# Patient Record
Sex: FEMALE | Race: WHITE | NOT HISPANIC OR LATINO | Employment: FULL TIME | URBAN - METROPOLITAN AREA
[De-identification: names, ages, dates, MRNs, and addresses within clinical notes are randomized per-mention and may not be internally consistent; named-entity substitution may affect disease eponyms.]

---
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
## 2021-01-15 ENCOUNTER — HOSPITAL ENCOUNTER (EMERGENCY)
Facility: HOSPITAL | Age: 21
Discharge: HOME | End: 2021-01-15
Attending: EMERGENCY MEDICINE | Admitting: EMERGENCY MEDICINE
Payer: COMMERCIAL

## 2021-01-15 ENCOUNTER — APPOINTMENT (EMERGENCY)
Dept: CT IMAGING | Facility: HOSPITAL | Age: 21
End: 2021-01-15
Payer: COMMERCIAL

## 2021-01-15 ENCOUNTER — OFFICE VISIT (OUTPATIENT)
Dept: URGENT CARE | Facility: MEDICAL CENTER | Age: 21
End: 2021-01-15
Payer: COMMERCIAL

## 2021-01-15 VITALS
HEART RATE: 101 BPM | TEMPERATURE: 98.3 F | OXYGEN SATURATION: 98 % | DIASTOLIC BLOOD PRESSURE: 90 MMHG | RESPIRATION RATE: 18 BRPM | SYSTOLIC BLOOD PRESSURE: 175 MMHG

## 2021-01-15 VITALS
WEIGHT: 220 LBS | RESPIRATION RATE: 18 BRPM | SYSTOLIC BLOOD PRESSURE: 105 MMHG | OXYGEN SATURATION: 99 % | HEIGHT: 67 IN | TEMPERATURE: 98 F | BODY MASS INDEX: 34.53 KG/M2 | DIASTOLIC BLOOD PRESSURE: 72 MMHG | HEART RATE: 114 BPM

## 2021-01-15 DIAGNOSIS — R10.9 ABDOMINAL PAIN: Primary | ICD-10-CM

## 2021-01-15 DIAGNOSIS — R10.31 RIGHT LOWER QUADRANT ABDOMINAL PAIN: Primary | ICD-10-CM

## 2021-01-15 LAB
ALBUMIN SERPL BCP-MCNC: 4.2 G/DL (ref 3.5–5)
ALP SERPL-CCNC: 62 U/L (ref 46–384)
ALT SERPL W P-5'-P-CCNC: 35 U/L (ref 12–78)
ANION GAP SERPL CALCULATED.3IONS-SCNC: 10 MMOL/L (ref 4–13)
AST SERPL W P-5'-P-CCNC: 22 U/L (ref 5–45)
BACTERIA UR QL AUTO: ABNORMAL /HPF
BASOPHILS # BLD AUTO: 0.06 THOUSANDS/ΜL (ref 0–0.1)
BASOPHILS NFR BLD AUTO: 1 % (ref 0–1)
BILIRUB SERPL-MCNC: 0.6 MG/DL (ref 0.2–1)
BILIRUB UR QL STRIP: NEGATIVE
BUN SERPL-MCNC: 15 MG/DL (ref 5–25)
CALCIUM SERPL-MCNC: 9.2 MG/DL (ref 8.3–10.1)
CHLORIDE SERPL-SCNC: 103 MMOL/L (ref 100–108)
CLARITY UR: CLEAR
CO2 SERPL-SCNC: 26 MMOL/L (ref 21–32)
COLOR UR: ABNORMAL
CREAT SERPL-MCNC: 1 MG/DL (ref 0.6–1.3)
EOSINOPHIL # BLD AUTO: 0.02 THOUSAND/ΜL (ref 0–0.61)
EOSINOPHIL NFR BLD AUTO: 0 % (ref 0–6)
ERYTHROCYTE [DISTWIDTH] IN BLOOD BY AUTOMATED COUNT: 12.5 % (ref 11.6–15.1)
EXT PREG TEST URINE: NEGATIVE
EXT. CONTROL ED NAV: NORMAL
GFR SERPL CREATININE-BSD FRML MDRD: 82 ML/MIN/1.73SQ M
GLUCOSE SERPL-MCNC: 87 MG/DL (ref 65–140)
GLUCOSE UR STRIP-MCNC: NEGATIVE MG/DL
HCT VFR BLD AUTO: 38.8 % (ref 34.8–46.1)
HGB BLD-MCNC: 12.8 G/DL (ref 11.5–15.4)
HGB UR QL STRIP.AUTO: ABNORMAL
IMM GRANULOCYTES # BLD AUTO: 0.03 THOUSAND/UL (ref 0–0.2)
IMM GRANULOCYTES NFR BLD AUTO: 0 % (ref 0–2)
KETONES UR STRIP-MCNC: NEGATIVE MG/DL
LEUKOCYTE ESTERASE UR QL STRIP: NEGATIVE
LIPASE SERPL-CCNC: 101 U/L (ref 73–393)
LYMPHOCYTES # BLD AUTO: 2.14 THOUSANDS/ΜL (ref 0.6–4.47)
LYMPHOCYTES NFR BLD AUTO: 23 % (ref 14–44)
MCH RBC QN AUTO: 30.7 PG (ref 26.8–34.3)
MCHC RBC AUTO-ENTMCNC: 33 G/DL (ref 31.4–37.4)
MCV RBC AUTO: 93 FL (ref 82–98)
MONOCYTES # BLD AUTO: 0.77 THOUSAND/ΜL (ref 0.17–1.22)
MONOCYTES NFR BLD AUTO: 8 % (ref 4–12)
MUCOUS THREADS UR QL AUTO: ABNORMAL
NEUTROPHILS # BLD AUTO: 6.47 THOUSANDS/ΜL (ref 1.85–7.62)
NEUTS SEG NFR BLD AUTO: 68 % (ref 43–75)
NITRITE UR QL STRIP: NEGATIVE
NON-SQ EPI CELLS URNS QL MICRO: ABNORMAL /HPF
NRBC BLD AUTO-RTO: 0 /100 WBCS
PH UR STRIP.AUTO: 6 [PH]
PLATELET # BLD AUTO: 397 THOUSANDS/UL (ref 149–390)
PMV BLD AUTO: 9.7 FL (ref 8.9–12.7)
POTASSIUM SERPL-SCNC: 3.6 MMOL/L (ref 3.5–5.3)
PROT SERPL-MCNC: 8.3 G/DL (ref 6.4–8.2)
PROT UR STRIP-MCNC: NEGATIVE MG/DL
RBC # BLD AUTO: 4.17 MILLION/UL (ref 3.81–5.12)
RBC #/AREA URNS AUTO: ABNORMAL /HPF
SL AMB  POCT GLUCOSE, UA: NEGATIVE
SL AMB LEUKOCYTE ESTERASE,UA: NEGATIVE
SL AMB POCT BILIRUBIN,UA: NEGATIVE
SL AMB POCT BLOOD,UA: ABNORMAL
SL AMB POCT CLARITY,UA: ABNORMAL
SL AMB POCT COLOR,UA: ABNORMAL
SL AMB POCT KETONES,UA: NEGATIVE
SL AMB POCT NITRITE,UA: NEGATIVE
SL AMB POCT PH,UA: 5
SL AMB POCT SPECIFIC GRAVITY,UA: 1.03
SL AMB POCT URINE PROTEIN: ABNORMAL
SL AMB POCT UROBILINOGEN: 0.2
SODIUM SERPL-SCNC: 139 MMOL/L (ref 136–145)
SP GR UR STRIP.AUTO: >=1.03 (ref 1–1.03)
UROBILINOGEN UR QL STRIP.AUTO: 0.2 E.U./DL
WBC # BLD AUTO: 9.49 THOUSAND/UL (ref 4.31–10.16)
WBC #/AREA URNS AUTO: ABNORMAL /HPF

## 2021-01-15 PROCEDURE — G0381 LEV 2 HOSP TYPE B ED VISIT: HCPCS | Performed by: PHYSICIAN ASSISTANT

## 2021-01-15 PROCEDURE — 85025 COMPLETE CBC W/AUTO DIFF WBC: CPT | Performed by: EMERGENCY MEDICINE

## 2021-01-15 PROCEDURE — 99282 EMERGENCY DEPT VISIT SF MDM: CPT | Performed by: PHYSICIAN ASSISTANT

## 2021-01-15 PROCEDURE — 83690 ASSAY OF LIPASE: CPT | Performed by: EMERGENCY MEDICINE

## 2021-01-15 PROCEDURE — 81001 URINALYSIS AUTO W/SCOPE: CPT | Performed by: EMERGENCY MEDICINE

## 2021-01-15 PROCEDURE — 74177 CT ABD & PELVIS W/CONTRAST: CPT

## 2021-01-15 PROCEDURE — 99284 EMERGENCY DEPT VISIT MOD MDM: CPT | Performed by: EMERGENCY MEDICINE

## 2021-01-15 PROCEDURE — 99284 EMERGENCY DEPT VISIT MOD MDM: CPT

## 2021-01-15 PROCEDURE — 96361 HYDRATE IV INFUSION ADD-ON: CPT

## 2021-01-15 PROCEDURE — 96374 THER/PROPH/DIAG INJ IV PUSH: CPT

## 2021-01-15 PROCEDURE — 81025 URINE PREGNANCY TEST: CPT | Performed by: EMERGENCY MEDICINE

## 2021-01-15 PROCEDURE — 80053 COMPREHEN METABOLIC PANEL: CPT | Performed by: EMERGENCY MEDICINE

## 2021-01-15 PROCEDURE — 81002 URINALYSIS NONAUTO W/O SCOPE: CPT | Performed by: PHYSICIAN ASSISTANT

## 2021-01-15 PROCEDURE — 36415 COLL VENOUS BLD VENIPUNCTURE: CPT | Performed by: EMERGENCY MEDICINE

## 2021-01-15 RX ORDER — KETOROLAC TROMETHAMINE 30 MG/ML
15 INJECTION, SOLUTION INTRAMUSCULAR; INTRAVENOUS ONCE
Status: COMPLETED | OUTPATIENT
Start: 2021-01-15 | End: 2021-01-15

## 2021-01-15 RX ADMIN — KETOROLAC TROMETHAMINE 15 MG: 30 INJECTION, SOLUTION INTRAMUSCULAR at 21:32

## 2021-01-15 RX ADMIN — SODIUM CHLORIDE 1000 ML: 0.9 INJECTION, SOLUTION INTRAVENOUS at 21:25

## 2021-01-15 RX ADMIN — IOHEXOL 100 ML: 350 INJECTION, SOLUTION INTRAVENOUS at 22:26

## 2021-01-16 NOTE — ED PROVIDER NOTES
History  Chief Complaint   Patient presents with    Abdominal Pain     Pt presents to ED from urgent care with right upper quadrant pain, denies n/v, states blood in stool Monday,       23-year-old female presents for evaluation of right upper quadrant abdominal pain it has been ongoing for the last 4 days  Denies having similar symptoms previously  Pain is worse with palpation and after eating  Pain has been constant, moderate  Was evaluated at urgent care and advised to come to the emergency department for further evaluation  Patient is currently expecting her menstrual cycle but has not started yet  LMP approximately 4 weeks ago  Denies vaginal bleeding/discharge at this time  No urinary symptoms  Prior to Admission Medications   Prescriptions Last Dose Informant Patient Reported? Taking? JUNEL FE 1/20 1-20 MG-MCG per tablet   No No   Sig: TAKE 1 TABLET BY MOUTH EVERY DAY   QUEtiapine (SEROquel) 50 mg tablet  Self Yes No   Sig: Take 100 mg by mouth daily at bedtime    meclizine (ANTIVERT) 25 mg tablet   No No   Sig: Take 1 tablet (25 mg total) by mouth 3 (three) times a day as needed for dizziness   Patient not taking: Reported on 1/15/2021      Facility-Administered Medications Last Administration Doses Remaining   cyanocobalamin injection 1,000 mcg 8/25/2020  3:17 PM           Past Medical History:   Diagnosis Date    Bipolar 1 disorder ([facility] [address] )     Psychiatric disorder     bipolar        History reviewed  No pertinent surgical history  Family History   Problem Relation Age of Onset    Anxiety disorder Family     Other Family         cardiac disorder    Diabetes Family     Hypertension Family     Breast cancer Family     Colon cancer Neg Hx     Colon polyps Neg Hx      I have reviewed and agree with the history as documented      E-Cigarette/Vaping    E-Cigarette Use Current Every Day User      E-Cigarette/Vaping Substances    Nicotine Yes     THC No     CBD No     Flavoring Yes Social History     Tobacco Use    Smoking status: Current Every Day Smoker     Types: E-Cigarettes    Smokeless tobacco: Never Used   Substance Use Topics    Alcohol use: Never     Frequency: Never    Drug use: Yes     Types: Marijuana     Comment: Medical MJ       Review of Systems   Constitutional: Negative for chills, diaphoresis and fever  HENT: Negative for congestion and rhinorrhea  Eyes: Negative for pain and visual disturbance  Respiratory: Negative for cough, shortness of breath and wheezing  Cardiovascular: Negative for chest pain and leg swelling  Gastrointestinal: Positive for abdominal pain  Negative for diarrhea, nausea and vomiting  Genitourinary: Negative for difficulty urinating, dysuria, frequency and urgency  Musculoskeletal: Negative for back pain and neck pain  Skin: Negative for color change and rash  Neurological: Negative for syncope, numbness and headaches  All other systems reviewed and are negative  Physical Exam  Physical Exam  Vitals signs and nursing note reviewed  Constitutional:       Appearance: She is well-developed  HENT:      Head: Normocephalic and atraumatic  Eyes:      Conjunctiva/sclera: Conjunctivae normal    Neck:      Musculoskeletal: Normal range of motion and neck supple  Cardiovascular:      Rate and Rhythm: Normal rate and regular rhythm  Pulmonary:      Effort: Pulmonary effort is normal  No respiratory distress  Abdominal:      Palpations: Abdomen is soft  Tenderness: There is abdominal tenderness in the right upper quadrant, right lower quadrant and epigastric area  Comments: Tender to palpation in right upper quadrant  Positive Merchant sign  Musculoskeletal: Normal range of motion  General: No tenderness  Skin:     General: Skin is warm  Findings: No erythema  Neurological:      Mental Status: She is alert and oriented to person, place, and time     Psychiatric:         Behavior: Behavior normal          Vital Signs  ED Triage Vitals   Temperature Pulse Respirations Blood Pressure SpO2   01/15/21 2252 01/15/21 2101 01/15/21 2101 01/15/21 2101 01/15/21 2101   98 3 °F (36 8 °C) (!) 126 18 141/75 99 %      Temp src Heart Rate Source Patient Position - Orthostatic VS BP Location FiO2 (%)   -- 01/15/21 2300 -- -- --    Monitor         Pain Score       01/15/21 2132       6           Vitals:    01/15/21 2101 01/15/21 2230 01/15/21 2300   BP: 141/75 124/82 (!) 175/90   Pulse: (!) 126  101         Visual Acuity      ED Medications  Medications   ketorolac (TORADOL) injection 15 mg (15 mg Intravenous Given 1/15/21 2132)   sodium chloride 0 9 % bolus 1,000 mL (0 mL Intravenous Stopped 1/15/21 2225)   iohexol (OMNIPAQUE) 350 MG/ML injection (SINGLE-DOSE) 100 mL (100 mL Intravenous Given 1/15/21 2226)       Diagnostic Studies  Results Reviewed     Procedure Component Value Units Date/Time    Urine Microscopic [[id number]]  (Abnormal) Collected: 01/15/21 2131    Lab Status: Final result Specimen: Urine, Clean Catch Updated: 01/15/21 2206     RBC, UA 2-4 /hpf      WBC, UA 0-1 /hpf      Epithelial Cells Moderate /hpf      Bacteria, UA Moderate /hpf      MUCUS THREADS Innumerable    CMP [[id number]]  (Abnormal) Collected: 01/15/21 2124    Lab Status: Final result Specimen: Blood from Arm, Right Updated: 01/15/21 2159     Sodium 139 mmol/L      Potassium 3 6 mmol/L      Chloride 103 mmol/L      CO2 26 mmol/L      ANION GAP 10 mmol/L      BUN 15 mg/dL      Creatinine 1 00 mg/dL      Glucose 87 mg/dL      Calcium 9 2 mg/dL      AST 22 U/L      ALT 35 U/L      Alkaline Phosphatase 62 U/L      Total Protein 8 3 g/dL      Albumin 4 2 g/dL      Total Bilirubin 0 60 mg/dL      eGFR 82 ml/min/1 73sq m     Narrative:      James guidelines for Chronic Kidney Disease (CKD):     Stage 1 with normal or high GFR (GFR > 90 mL/min/1 73 square meters)    Stage 2 Mild CKD (GFR = 60-89 mL/min/1 73 square meters)    Stage 3A Moderate CKD (GFR = 45-59 mL/min/1 73 square meters)    Stage 3B Moderate CKD (GFR = 30-44 mL/min/1 73 square meters)    Stage 4 Severe CKD (GFR = 15-29 mL/min/1 73 square meters)    Stage 5 End Stage CKD (GFR <15 mL/min/1 73 square meters)  Note: GFR calculation is accurate only with a steady state creatinine    Lipase [[id number]]  (Normal) Collected: 01/15/21 2124    Lab Status: Final result Specimen: Blood from Arm, Right Updated: 01/15/21 2159     Lipase 101 u/L     POCT pregnancy, urine [[id number]]  (Normal) Resulted: 01/15/21 2130    Lab Status: Final result Specimen: Urine Updated: 01/15/21 2159     EXT PREG TEST UR (Ref: Negative) Negative     Control Valid    UA w Reflex to Microscopic w Reflex to Culture [[id number]]  (Abnormal) Collected: 01/15/21 2131    Lab Status: Final result Specimen: Urine, Clean Catch Updated: 01/15/21 2149     Color, UA Dk Yellow     Clarity, UA Clear     Specific Gravity, UA >=1 030     pH, UA 6 0     Leukocytes, UA Negative     Nitrite, UA Negative     Protein, UA Negative mg/dl      Glucose, UA Negative mg/dl      Ketones, UA Negative mg/dl      Urobilinogen, UA 0 2 E U /dl      Bilirubin, UA Negative     Blood, UA Small    CBC and differential [[id number]]  (Abnormal) Collected: 01/15/21 2124    Lab Status: Final result Specimen: Blood from Arm, Right Updated: 01/15/21 2131     WBC 9 49 Thousand/uL      RBC 4 17 Million/uL      Hemoglobin 12 8 g/dL      Hematocrit 38 8 %      MCV 93 fL      MCH 30 7 pg      MCHC 33 0 g/dL      RDW 12 5 %      MPV 9 7 fL      Platelets 686 Thousands/uL      nRBC 0 /100 WBCs      Neutrophils Relative 68 %      Immat GRANS % 0 %      Lymphocytes Relative 23 %      Monocytes Relative 8 %      Eosinophils Relative 0 %      Basophils Relative 1 %      Neutrophils Absolute 6 47 Thousands/µL      Immature Grans Absolute 0 03 Thousand/uL      Lymphocytes Absolute 2 14 Thousands/µL      Monocytes Absolute 0 77 Thousand/µL Eosinophils Absolute 0 02 Thousand/µL      Basophils Absolute 0 06 Thousands/µL                  CT Abdomen pelvis with contrast   Final Result by Tara Carlson DO (01/15 2250)      No acute findings            Workstation performed: [id number]                    Procedures  Procedures         ED Course                                           MDM  Number of Diagnoses or Management Options  Diagnosis management comments: 81-year-old female presenting with right-sided abdominal pain most prominent in right upper quadrant  Will obtain labs, urinalysis and pregnancy test   CT abdomen pelvis  Pain control as needed  Disposition  Final diagnoses:   Abdominal pain     Time reflects when diagnosis was documented in both MDM as applicable and the Disposition within this note     Time User Action Codes Description Comment    1/15/2021 10:59 PM Guillermo Beebe Add [R10 9] Abdominal pain       ED Disposition     ED Disposition Condition Date/Time Comment    Discharge Stable Fri Carlos 15, 2021 10:59 PM Elta Session discharge to home/self care              Follow-up Information     Follow up With Specialties Details Why Contact Info Additional Information    Gissel Monteiro MD Family Medicine Schedule an appointment as soon as possible for a visit   [address] [facility] 2  [facility] 98  Emergency Department Emergency Medicine  If symptoms worsen [address] [address]  [address] [facility] Emergency Department, [address] [facility], [address]    [address] [facility] [address] Gastroenterology Schedule an appointment as soon as possible for a visit   [address] [address]  [phone] [address] [facility] [address] , [facility] Space [address] [place] [address], [address] [phone]          Discharge Medication List as of 1/15/2021 11:03 PM      CONTINUE these medications which have NOT CHANGED    Details   JUNEL FE 1/20 1-20 MG-MCG per tablet TAKE 1 TABLET BY MOUTH EVERY DAY, Normal      meclizine (ANTIVERT) 25 mg tablet Take 1 tablet (25 mg total) by mouth 3 (three) times a day as needed for dizziness, Starting u 10/15/2020, Normal      QUEtiapine (SEROquel) 50 mg tablet Take 100 mg by mouth daily at bedtime , Historical Med           No discharge procedures on file      PDMP Review     None          ED Provider  Electronically Signed by           Kalyn Singh DO  01/16/21 6167

## 2021-01-16 NOTE — PROGRESS NOTES
[facility]        NAME: Samantha Bingham is a 24 y o  female  : 2000    MRN: [id number]  DATE: January 15, 2021  TIME: 7:58 PM    Assessment and Plan   Right lower quadrant abdominal pain [R10 31]  1  Right lower quadrant abdominal pain  Urine culture    POCT urine dip    Transfer to other facility         Patient Instructions       To emergency room now  I explained to her did we cannot evaluate his further here  Chief Complaint     Chief Complaint   Patient presents with    Abdominal Pain     Pt c/o urinary frequency, intermittent sharp right-sided abdominal pain for the past 5 days  Pt also reports having diarrhea, blood in her stool for 5 days  Pt is concerned because she recently was treated for chlamydia in November  When asked about her high HR, patient states she is currently high on medical marijuana due to the pain  History of Present Illness       Patient has a history of endometriosis but she said this pain is different  She has also had blood in her stools on over the last 2 days  She is to start her menstrual cycle tomorrow  There is blood in her urine  Abdominal Pain  This is a new problem  The current episode started in the past 7 days  The onset quality is sudden  The problem occurs intermittently  The problem has been gradually worsening  The pain is located in the RLQ  The pain is at a severity of 6/10  The quality of the pain is cramping and sharp  The abdominal pain radiates to the RUQ  Associated symptoms include anorexia, hematuria and melena  Pertinent negatives include no arthralgias, belching, constipation, diarrhea, dysuria, fever, flatus, frequency, headaches, hematochezia, myalgias, nausea, vomiting or weight loss  The pain is aggravated by bowel movement, eating and palpation  The pain is relieved by nothing  She has tried nothing for the symptoms  Review of Systems   Review of Systems   Constitutional: Negative for fever and weight loss  Gastrointestinal: Positive for abdominal pain, anorexia and melena  Negative for constipation, diarrhea, flatus, hematochezia, nausea and vomiting  Genitourinary: Positive for hematuria  Negative for dysuria and frequency  Musculoskeletal: Negative for arthralgias and myalgias  Neurological: Negative for headaches  All other systems reviewed and are negative  Current Medications       Current Outpatient Medications:     JUNEL FE 1/20 1-20 MG-MCG per tablet, TAKE 1 TABLET BY MOUTH EVERY DAY, Disp: 84 tablet, Rfl: 3    meclizine (ANTIVERT) 25 mg tablet, Take 1 tablet (25 mg total) by mouth 3 (three) times a day as needed for dizziness (Patient not taking: Reported on 1/15/2021), Disp: 30 tablet, Rfl: 5    QUEtiapine (SEROquel) 50 mg tablet, Take 100 mg by mouth daily at bedtime , Disp: , Rfl:     Current Facility-Administered Medications:     cyanocobalamin injection 1,000 mcg, 1,000 mcg, Intramuscular, Q30 Days, KATINA Cho, 1,000 mcg at 08/25/20 1517    Current Allergies     Allergies as of 01/15/2021 - Reviewed 01/15/2021   Allergen Reaction Noted    Azithromycin Abdominal Pain 09/21/2017            The following portions of the patient's history were reviewed and updated as appropriate: allergies, current medications, past family history, past medical history, past social history, past surgical history and problem list      Past Medical History:   Diagnosis Date    Bipolar 1 disorder ([facility] [address] )     Psychiatric disorder     bipolar        History reviewed  No pertinent surgical history  Family History   Problem Relation Age of Onset    Anxiety disorder Family     Other Family         cardiac disorder    Diabetes Family     Hypertension Family     Breast cancer Family     Colon cancer Neg Hx     Colon polyps Neg Hx          Medications have been verified          Objective   /72   Pulse (!) 114   Temp 98 °F (36 7 °C) (Oral)   Resp 18   Ht 5' 7" (1 702 m)   Wt 99 8 kg (220 lb)   LMP 12/18/2020 (Approximate)   SpO2 99%   BMI 34 46 kg/m²   Patient's last menstrual period was 12/18/2020 (approximate)  Physical Exam     Physical Exam  Vitals signs and nursing note reviewed  Constitutional:       Appearance: She is well-developed  She is obese  Abdominal:      General: Bowel sounds are decreased  Palpations: Abdomen is soft  Tenderness: There is abdominal tenderness in the right lower quadrant  Positive signs include McBurney's sign  Neurological:      Mental Status: She is alert

## 2021-01-18 ENCOUNTER — VBI (OUTPATIENT)
Dept: FAMILY MEDICINE CLINIC | Facility: CLINIC | Age: 21
End: 2021-01-18

## 2021-01-18 NOTE — TELEPHONE ENCOUNTER
Pritesh Arguello    ED Visit Information     Ed visit date: 1/15/2021  Diagnosis Description:   Abdominal pain     In Network? Yes [address]  Discharge status: Home  Discharged with meds ? No  Number of ED visits to date: 1  ED Severity:NA     Outreach Information    Outreach successful: Yes 1  Date letter mailed:NA  Date Finalized:1/18/2021    Care Coordination    Follow up appointment with pcp: no Declined  Transportation issues ? No    [facility] [employer] type: 7 Day Outreach  Emergent necessity warranted by diagnosis: No  [facility] PCP: Yes  Transportation: Friend/Family Transport  01/18/2021 03:25 PM Phone ([employer]) Jarocho Padilla (Self) [phone] (H)   Call Complete  Personal communication with patient regarding her recent ED visit on 1/15/2021 for Abdominal pain  Patient was discharged without medication and advised to follow up with PCP and gastroenterology  Patient stated that she is feeling better since making dietary changes  Patient declined to schedule a follow up appointment  Patient does not meet OPCM criteria  Patient is aware of PCP after hours answering service and her nearest care now facility

## 2021-01-22 ENCOUNTER — OFFICE VISIT (OUTPATIENT)
Dept: FAMILY MEDICINE CLINIC | Facility: CLINIC | Age: 21
End: 2021-01-22
Payer: COMMERCIAL

## 2021-01-22 VITALS
DIASTOLIC BLOOD PRESSURE: 78 MMHG | BODY MASS INDEX: 35.47 KG/M2 | SYSTOLIC BLOOD PRESSURE: 118 MMHG | HEIGHT: 67 IN | WEIGHT: 226 LBS | HEART RATE: 104 BPM | TEMPERATURE: 98.1 F | RESPIRATION RATE: 16 BRPM | OXYGEN SATURATION: 97 %

## 2021-01-22 DIAGNOSIS — Z11.3 SCREENING FOR STDS (SEXUALLY TRANSMITTED DISEASES): ICD-10-CM

## 2021-01-22 DIAGNOSIS — J01.10 ACUTE FRONTAL SINUSITIS, RECURRENCE NOT SPECIFIED: Primary | ICD-10-CM

## 2021-01-22 DIAGNOSIS — R07.9 CHEST PAIN, UNSPECIFIED TYPE: ICD-10-CM

## 2021-01-22 DIAGNOSIS — R35.0 URINARY FREQUENCY: ICD-10-CM

## 2021-01-22 LAB
SL AMB  POCT GLUCOSE, UA: NORMAL
SL AMB LEUKOCYTE ESTERASE,UA: NORMAL
SL AMB POCT BILIRUBIN,UA: NORMAL
SL AMB POCT BLOOD,UA: NORMAL
SL AMB POCT CLARITY,UA: CLEAR
SL AMB POCT COLOR,UA: NORMAL
SL AMB POCT KETONES,UA: 15
SL AMB POCT NITRITE,UA: NORMAL
SL AMB POCT PH,UA: 5.5
SL AMB POCT SPECIFIC GRAVITY,UA: >=1.03
SL AMB POCT URINE PROTEIN: NORMAL
SL AMB POCT UROBILINOGEN: 0.2

## 2021-01-22 PROCEDURE — 99214 OFFICE O/P EST MOD 30 MIN: CPT | Performed by: NURSE PRACTITIONER

## 2021-01-22 PROCEDURE — 81002 URINALYSIS NONAUTO W/O SCOPE: CPT | Performed by: NURSE PRACTITIONER

## 2021-01-22 PROCEDURE — 93000 ELECTROCARDIOGRAM COMPLETE: CPT | Performed by: NURSE PRACTITIONER

## 2021-01-22 RX ORDER — DOXYCYCLINE HYCLATE 100 MG/1
100 CAPSULE ORAL EVERY 12 HOURS SCHEDULED
Qty: 20 CAPSULE | Refills: 0 | Status: SHIPPED | OUTPATIENT
Start: 2021-01-22 | End: 2021-02-01

## 2021-01-22 NOTE — LETTER
January 22, 2021     Patient: Bruno Rivera   YOB: 2000   Date of Visit: 1/22/2021       To Whom it May Concern:    Bruno Rivera is under my professional care  She was seen in my office on 1/22/2021  She may return to work on 1/24/2021  My suspicion is low that symptoms are related to Covid  If you have any questions or concerns, please don't hesitate to call           Sincerely,          KATINA Steward        CC: No Recipients

## 2021-01-22 NOTE — PROGRESS NOTES
[facility]        NAME: Sae Mccollum is a 24 y o  female  : 2000    MRN: [id number]  DATE: 2021  TIME: 12:54 PM    Assessment and Plan   Acute frontal sinusitis, recurrence not specified [J01 10]  1  Acute frontal sinusitis, recurrence not specified  doxycycline hyclate (VIBRAMYCIN) 100 mg capsule   2  Screening for STDs (sexually transmitted diseases)  Chlamydia/GC amplified DNA by PCR    Chlamydia/GC amplified DNA by PCR   3  Chest pain, unspecified type  POCT ECG   4  Urinary frequency  POCT urine dip         Patient Instructions     Patient Instructions   Take antibiotic as directed to treat sinusitis  OTC cold/sinus medications to treat symptoms  EKG was normal  Chest pain, fast heart rate may have been from feeling anxious/paniky- find ways to control stress and anxiety(deep breathing, exercise, mediation, therapy)  Urine dip in office was negative  Chlamydia screening as ordered  If symptoms do not improve after you are finished antibiotic call office for a f/up visit  Chief Complaint     Chief Complaint   Patient presents with    Follow-up     SOB/IRREGULAR HEART RATE/COGNITIVE ISSUES X 1 WEEK         History of Present Illness       C/o shortness of breath, fast and irregular heart rate, headache, brain fog x 1 week  Also has sinus pain and pressure, post nasal drip x 1 week  No fever  Hx of vertigo and was supposed to see therapy but had to cancel last month due to being sick  Last night had sharp pain in chest and increase shortness of breath  Has a hx of panic attacks but this feels different  Feels "drunk", does not feel normal    She states her boyfriend had covid in December, she was exposed at that time and had symptoms  She said she had a recent negative Covid test   C/o urinary frequency all the time  States she had chlamydia infection in November and was treated with antibiotics   She wants a test to make sure infection is gone       Review of Systems   Review of Systems   Constitutional: Negative for activity change, appetite change, chills, diaphoresis, fatigue and fever  HENT: Positive for postnasal drip, sinus pressure and sinus pain  Negative for congestion, ear pain, facial swelling and sore throat  Eyes: Positive for photophobia and visual disturbance  Respiratory: Positive for chest tightness and shortness of breath  Negative for cough  Cardiovascular: Positive for chest pain and palpitations  Negative for leg swelling  Gastrointestinal: Positive for nausea  Negative for abdominal pain and vomiting  Genitourinary: Positive for frequency  Negative for dysuria, flank pain, pelvic pain and urgency  Skin: Negative for rash  Neurological: Positive for dizziness, light-headedness and headaches  Negative for weakness and numbness  Psychiatric/Behavioral: Positive for decreased concentration (brain fog)  The patient is nervous/anxious            Current Medications       Current Outpatient Medications:     JUNEL FE 1/20 1-20 MG-MCG per tablet, TAKE 1 TABLET BY MOUTH EVERY DAY, Disp: 84 tablet, Rfl: 3    meclizine (ANTIVERT) 25 mg tablet, Take 1 tablet (25 mg total) by mouth 3 (three) times a day as needed for dizziness, Disp: 30 tablet, Rfl: 5    doxycycline hyclate (VIBRAMYCIN) 100 mg capsule, Take 1 capsule (100 mg total) by mouth every 12 (twelve) hours for 10 days, Disp: 20 capsule, Rfl: 0    QUEtiapine (SEROquel) 50 mg tablet, Take 100 mg by mouth daily at bedtime , Disp: , Rfl:     Current Facility-Administered Medications:     cyanocobalamin injection 1,000 mcg, 1,000 mcg, Intramuscular, Q30 Days, KATINA Gentile, 1,000 mcg at 08/25/20 1517    Current Allergies     Allergies as of 01/22/2021 - Reviewed 01/22/2021   Allergen Reaction Noted    Azithromycin Abdominal Pain 09/21/2017            The following portions of the patient's history were reviewed and updated as appropriate: allergies, current medications, past family history, past medical history, past social history, past surgical history and problem list      Past Medical History:   Diagnosis Date    Bipolar 1 disorder ([address] )     Psychiatric disorder     bipolar        History reviewed  No pertinent surgical history  Family History   Problem Relation Age of Onset    Anxiety disorder Family     Other Family         cardiac disorder    Diabetes Family     Hypertension Family     Breast cancer Family     Colon cancer Neg Hx     Colon polyps Neg Hx          Medications have been verified  Objective   /78   Pulse 104   Temp 98 1 °F (36 7 °C)   Resp 16   Ht 5' 7" (1 702 m)   Wt 103 kg (226 lb)   LMP 01/18/2021   SpO2 97%   BMI 35 40 kg/m²        Physical Exam     Physical Exam  Vitals signs and nursing note reviewed  Constitutional:       General: She is not in acute distress  Appearance: Normal appearance  She is well-developed  She is not diaphoretic  HENT:      Head: Normocephalic and atraumatic  Right Ear: Tympanic membrane, ear canal and external ear normal  There is no impacted cerumen  Left Ear: Tympanic membrane, ear canal and external ear normal  There is no impacted cerumen  Nose: Rhinorrhea present  Right Sinus: Maxillary sinus tenderness and frontal sinus tenderness present  Left Sinus: Maxillary sinus tenderness and frontal sinus tenderness present  Mouth/Throat:      Mouth: Mucous membranes are moist       Pharynx: Uvula midline  Posterior oropharyngeal erythema present  No oropharyngeal exudate  Comments: Moderate post nasal drip present  Eyes:      General:         Right eye: No discharge  Left eye: No discharge  Extraocular Movements: Extraocular movements intact  Conjunctiva/sclera: Conjunctivae normal       Pupils: Pupils are equal, round, and reactive to light  Neck:      Musculoskeletal: Normal range of motion and neck supple   No neck rigidity or muscular tenderness  Thyroid: No thyromegaly  Vascular: No carotid bruit  Trachea: No tracheal deviation  Cardiovascular:      Rate and Rhythm: Normal rate and regular rhythm  Heart sounds: Normal heart sounds  No murmur  No friction rub  No gallop  Comments: EKG done in office today- Normal sinus rhythm   Pulmonary:      Effort: Pulmonary effort is normal  No respiratory distress  Breath sounds: Normal breath sounds  No wheezing or rales  Abdominal:      General: There is no distension  Tenderness: There is no right CVA tenderness or left CVA tenderness  Comments: Urine dip in office had a trace of blood otherwise normal    Musculoskeletal: Normal range of motion  General: No tenderness or deformity  Lymphadenopathy:      Cervical: No cervical adenopathy  Skin:     General: Skin is warm and dry  Findings: No erythema or rash  Neurological:      General: No focal deficit present  Mental Status: She is alert and oriented to person, place, and time  Cranial Nerves: No cranial nerve deficit  Sensory: No sensory deficit  Motor: No weakness  Coordination: Coordination normal       Gait: Gait normal    Psychiatric:         Mood and Affect: Mood normal          Behavior: Behavior normal  Behavior is cooperative  Thought Content: Thought content normal          Judgment: Judgment normal      BMI Counseling: Body mass index is 35 4 kg/m²  The BMI is above normal  Nutrition recommendations include decreasing portion sizes and encouraging healthy choices of fruits and vegetables

## 2021-01-22 NOTE — PATIENT INSTRUCTIONS
Take antibiotic as directed to treat sinusitis  OTC cold/sinus medications to treat symptoms  EKG was normal  Chest pain, fast heart rate may have been from feeling anxious/paniky- find ways to control stress and anxiety(deep breathing, exercise, mediation, therapy)  Urine dip in office was negative  Chlamydia screening as ordered  If symptoms do not improve after you are finished antibiotic call office for a f/up visit

## 2021-02-06 ENCOUNTER — OFFICE VISIT (OUTPATIENT)
Dept: URGENT CARE | Facility: CLINIC | Age: 21
End: 2021-02-06
Payer: COMMERCIAL

## 2021-02-06 VITALS
OXYGEN SATURATION: 98 % | RESPIRATION RATE: 16 BRPM | HEART RATE: 89 BPM | TEMPERATURE: 98.3 F | WEIGHT: 225 LBS | HEIGHT: 67 IN | BODY MASS INDEX: 35.31 KG/M2

## 2021-02-06 DIAGNOSIS — J01.81 OTHER ACUTE RECURRENT SINUSITIS: Primary | ICD-10-CM

## 2021-02-06 PROCEDURE — G0382 LEV 3 HOSP TYPE B ED VISIT: HCPCS | Performed by: PHYSICIAN ASSISTANT

## 2021-02-06 PROCEDURE — 99283 EMERGENCY DEPT VISIT LOW MDM: CPT | Performed by: PHYSICIAN ASSISTANT

## 2021-02-06 RX ORDER — AMOXICILLIN AND CLAVULANATE POTASSIUM 875; 125 MG/1; MG/1
1 TABLET, FILM COATED ORAL EVERY 12 HOURS SCHEDULED
Qty: 14 TABLET | Refills: 0 | Status: SHIPPED | OUTPATIENT
Start: 2021-02-06 | End: 2021-02-13

## 2021-02-06 NOTE — PROGRESS NOTES
[facility]        NAME: Leda Williamson is a 22 y o  female  : 2000    MRN: [id number]  DATE: 2021  TIME: 6:47 AM    Assessment and Plan   Other acute recurrent sinusitis [J01 81]  1  Other acute recurrent sinusitis  amoxicillin-clavulanate (AUGMENTIN) 875-125 mg per tablet         Patient Instructions       Follow up with PCP in 3-5 days  Proceed to  ER if symptoms worsen  Chief Complaint     Chief Complaint   Patient presents with    Vomiting     Pt states that she is unable to hold anything down and has a fever and runny nose for approx 2 days  Pt feels "constantly Nauseous"  States that she tested positive for covid the first week in January and has since recovered and had negative tests  History of Present Illness         64-year-old female presents the clinic with nausea, vomiting, fevers, runny nose that started 2 days ago  Patient states that she constantly feels nauseous and is also having ear pressure that is irritating her vertigo  Patient states that she did test positive for COVID-19 the  week of January and has completely recovered since then from all of her symptoms  Patient states that she was seen by her PCP on 2021 with a sinus infection and took  Ten days of doxycycline  Patient states that all her symptoms improved at that time until 2 days ago when her symptoms started again  Patient has similar symptoms to the ones she had when she was seen by her PCP on 21  Review of Systems   Review of Systems   Constitutional: Positive for appetite change (decreased) and fever (low grade fever)  Negative for chills and fatigue  HENT: Positive for rhinorrhea, sinus pressure (temples) and sore throat (1 week ago, resolved treated for sinus infection)  Negative for congestion and ear pain  No loss of sense of smell or taste   Respiratory: Negative for cough, chest tightness, shortness of breath and wheezing      Cardiovascular: Negative for chest pain and palpitations  Gastrointestinal: Negative for abdominal pain, diarrhea, nausea and vomiting  Musculoskeletal: Negative for myalgias  Skin: Negative for rash  Neurological: Positive for headaches  Negative for dizziness, weakness, light-headedness and numbness  All other systems reviewed and are negative  Current Medications       Current Outpatient Medications:     JUNEL FE 1/20 1-20 MG-MCG per tablet, TAKE 1 TABLET BY MOUTH EVERY DAY, Disp: 84 tablet, Rfl: 3    meclizine (ANTIVERT) 25 mg tablet, Take 1 tablet (25 mg total) by mouth 3 (three) times a day as needed for dizziness, Disp: 30 tablet, Rfl: 5    amoxicillin-clavulanate (AUGMENTIN) 875-125 mg per tablet, Take 1 tablet by mouth every 12 (twelve) hours for 7 days, Disp: 14 tablet, Rfl: 0    QUEtiapine (SEROquel) 50 mg tablet, Take 100 mg by mouth daily at bedtime , Disp: , Rfl:     Current Facility-Administered Medications:     cyanocobalamin injection 1,000 mcg, 1,000 mcg, Intramuscular, Q30 Days, KATINA Cho, 1,000 mcg at 08/25/20 1517    Current Allergies     Allergies as of 02/06/2021 - Reviewed 02/06/2021   Allergen Reaction Noted    Azithromycin Abdominal Pain 09/21/2017            The following portions of the patient's history were reviewed and updated as appropriate: allergies, current medications, past family history, past medical history, past social history, past surgical history and problem list      Past Medical History:   Diagnosis Date    Bipolar 1 disorder ([facility] [address] )     Psychiatric disorder     bipolar     Vertigo        History reviewed  No pertinent surgical history  Family History   Problem Relation Age of Onset    Anxiety disorder Family     Other Family         cardiac disorder    Diabetes Family     Hypertension Family     Breast cancer Family     Colon cancer Neg Hx     Colon polyps Neg Hx          Medications have been verified          Objective   Pulse 89   Temp 98 3 °F (36 8 °C)   Resp 16   Ht 5' 7" (1 702 m)   Wt 102 kg (225 lb)   LMP 01/18/2021   SpO2 98%   BMI 35 24 kg/m²   Patient's last menstrual period was 01/18/2021  Physical Exam     Physical Exam  Vitals signs and nursing note reviewed  Constitutional:       General: She is not in acute distress  Appearance: She is well-developed  She is not diaphoretic  HENT:      Head: Normocephalic and atraumatic  Nose: Congestion present  Right Sinus: Maxillary sinus tenderness and frontal sinus tenderness present  Left Sinus: Maxillary sinus tenderness and frontal sinus tenderness present  Mouth/Throat:      Lips: Pink  Mouth: Mucous membranes are moist       Pharynx: Uvula midline  Posterior oropharyngeal erythema (PND) present  No oropharyngeal exudate or uvula swelling  Eyes:      Conjunctiva/sclera: Conjunctivae normal    Neck:      Musculoskeletal: Normal range of motion  Cardiovascular:      Rate and Rhythm: Normal rate and regular rhythm  Heart sounds: Normal heart sounds  Pulmonary:      Effort: Pulmonary effort is normal       Breath sounds: Normal breath sounds  Musculoskeletal: Normal range of motion  Skin:     General: Skin is warm and dry  Capillary Refill: Capillary refill takes less than 2 seconds  Neurological:      Mental Status: She is alert and oriented to person, place, and time

## 2021-02-06 NOTE — PATIENT INSTRUCTIONS
Drink plenty of fluids  Take antibiotics as prescribed  Can take mucinex over the counter to help loosen mucous  Make sure you are drinking plenty of fluids to prevent worsening symptoms or dry mouth  Antibiotics can take up to 48 hours before you and started to notice improvement in symptoms  Follow up with family doctor if symptoms not improving after a few days  Sinusitis, Ambulatory Care   GENERAL INFORMATION:   Sinusitis  is inflammation or infection of your sinuses  It is most often caused by a virus  Acute sinusitis may last up to 12 weeks  Chronic sinusitis lasts longer than 12 weeks  Recurrent sinusitis is when you have 3 or more episodes of sinusitis in 1 year  Common symptoms include the following:   · Fever    · Pain, pressure, redness, or swelling around the forehead, cheeks, or eyes    · Thick yellow or green discharge from your nose    · Tenderness when you touch your face over your sinuses    · Dry cough that happens mostly at night or when you lie down    · Headache and face pain that is worse when you lean forward    · Teeth pain or pain when you chew  Seek immediate care for the following symptoms:   · Vision changes such as double vision    · Confusion or trouble thinking clearly    · Headache and stiff neck    · Trouble breathing  Treatment for sinusitis  may include medicines to relieve nasal and sinus congestion or to decrease pain and fever  Ask your healthcare provider which medicines you should take and how much is safe  Manage sinusitis:   · Drink liquids as directed  Ask your healthcare provider how much liquid to drink each day and which liquids are best for you  Liquids will help loosen and drain the mucus in your sinuses  · Breathe in steam   Heat a bowl of water until you see steam  Lean over the bowl and make a tent over your head with a large towel  Breathe deeply for about 20 minutes  Be careful not to get too close to the steam or burn yourself  Do this 3 times a day  You can also breathe deeply when you take a hot shower  · Rinse your sinuses  Use a sinus rinse device to rinse your nasal passages with a saline (salt water) solution  This will help thin the mucus in your nose and rinse away pollen and dirt  It will also help reduce swelling so you can breathe normally  Ask how often to do this  · Use heat on your sinuses  to decrease pain  Apply heat for 15 to 20 minutes every hour for as many days as directed  · Sleep with your head elevated  Place an extra pillow under your head before you go to sleep to help your sinuses drain  · Do not smoke and avoid secondhand smoke  If you smoke, it is never too late to quit  Ask for information about how to stop smoking if you need help  Prevent the spread of germs that cause sinusitis:  Wash your hands often with soap and water  Wash your hands after you use the bathroom, change a child's diaper, or sneeze  Wash your hands before you prepare or eat food  Follow up with your healthcare provider as directed:  Write down your questions so you remember to ask them during your visits  CARE AGREEMENT:   You have the right to help plan your care  Learn about your health condition and how it may be treated  Discuss treatment options with your caregivers to decide what care you want to receive  You always have the right to refuse treatment  The above information is an  only  It is not intended as medical advice for individual conditions or treatments  Talk to your doctor, nurse or pharmacist before following any medical regimen to see if it is safe and effective for you  © 2014 [address] is for End User's use only and may not be sold, redistributed or otherwise used for commercial purposes  All illustrations and images included in CareNotes® are the copyrighted property of [employer]  or Silvino Wray

## 2021-02-15 ENCOUNTER — HOSPITAL ENCOUNTER (OUTPATIENT)
Dept: MRI IMAGING | Facility: HOSPITAL | Age: 21
Discharge: HOME | End: 2021-02-15
Payer: COMMERCIAL

## 2021-02-15 DIAGNOSIS — R41.82 ALTERED MENTAL STATUS, UNSPECIFIED ALTERED MENTAL STATUS TYPE: ICD-10-CM

## 2021-02-15 PROCEDURE — A9585 GADOBUTROL INJECTION: HCPCS | Performed by: FAMILY MEDICINE

## 2021-02-15 PROCEDURE — 70553 MRI BRAIN STEM W/O & W/DYE: CPT

## 2021-02-15 PROCEDURE — G1004 CDSM NDSC: HCPCS

## 2021-02-15 RX ADMIN — GADOBUTROL 10 ML: 604.72 INJECTION INTRAVENOUS at 15:30

## 2021-02-16 ENCOUNTER — TELEPHONE (OUTPATIENT)
Dept: FAMILY MEDICINE CLINIC | Facility: CLINIC | Age: 21
End: 2021-02-16

## 2021-03-03 ENCOUNTER — TELEPHONE (OUTPATIENT)
Dept: FAMILY MEDICINE CLINIC | Facility: CLINIC | Age: 21
End: 2021-03-03

## 2021-03-03 LAB
C TRACH RRNA SPEC QL NAA+PROBE: NOT DETECTED
N GONORRHOEA RRNA SPEC QL NAA+PROBE: NOT DETECTED

## 2021-03-03 NOTE — TELEPHONE ENCOUNTER
----- Message from [address] [place] [address],[address] sent at 3/3/2021  2:36 PM EST -----  Call pt   Urine negative for GC/chlamydia

## 2021-03-30 ENCOUNTER — TRANSCRIBE ORDERS (OUTPATIENT)
Dept: ADMINISTRATIVE | Facility: HOSPITAL | Age: 21
End: 2021-03-30

## 2021-03-30 DIAGNOSIS — E28.1 ANDROGEN EXCESS: Primary | ICD-10-CM

## 2021-03-30 DIAGNOSIS — N64.4 MASTODYNIA: Primary | ICD-10-CM

## 2021-04-06 ENCOUNTER — OFFICE VISIT (OUTPATIENT)
Dept: URGENT CARE | Facility: MEDICAL CENTER | Age: 21
End: 2021-04-06
Payer: COMMERCIAL

## 2021-04-06 VITALS — HEIGHT: 67 IN | BODY MASS INDEX: 36.1 KG/M2 | WEIGHT: 230 LBS

## 2021-04-06 DIAGNOSIS — J30.9 ALLERGIC RHINITIS, UNSPECIFIED SEASONALITY, UNSPECIFIED TRIGGER: Primary | ICD-10-CM

## 2021-04-06 PROCEDURE — 99283 EMERGENCY DEPT VISIT LOW MDM: CPT | Performed by: FAMILY MEDICINE

## 2021-04-06 PROCEDURE — G0382 LEV 3 HOSP TYPE B ED VISIT: HCPCS | Performed by: FAMILY MEDICINE

## 2021-04-06 RX ORDER — FLUTICASONE PROPIONATE 50 MCG
2 SPRAY, SUSPENSION (ML) NASAL DAILY
Qty: 1 BOTTLE | Refills: 0 | Status: SHIPPED | OUTPATIENT
Start: 2021-04-06 | End: 2021-05-01 | Stop reason: SDUPTHER

## 2021-04-06 RX ORDER — BENZONATATE 100 MG/1
100 CAPSULE ORAL 3 TIMES DAILY PRN
Qty: 20 CAPSULE | Refills: 0 | Status: SHIPPED | OUTPATIENT
Start: 2021-04-06 | End: 2022-02-09

## 2021-04-07 NOTE — PROGRESS NOTES
[facility]        NAME: Shauna Partida is a 22 y o  female  : 2000    MRN: [id number]  DATE: 2021  TIME: 9:29 PM    Assessment and Plan   Allergic rhinitis, unspecified seasonality, unspecified trigger [J30 9]  1  Allergic rhinitis, unspecified seasonality, unspecified trigger  fluticasone (FLONASE) 50 mcg/act nasal spray    benzonatate (TESSALON PERLES) 100 mg capsule         Patient Instructions       Follow up with PCP in 3-5 days  Proceed to  ER if symptoms worsen  Chief Complaint     Chief Complaint   Patient presents with    Sinus Problem     Patient states she has recently been treated for sinu infections twice  She states she ha gillespie elevated WBC count  Symptoms includes fatigue, unknown if she has a temp, diarrhea, and pressure in L ear  Patietn denies nay sinus pressure  History of Present Illness        73-year-old female here today with cold symptoms for the past week  Symptoms consist of nasal congestion but denies sinus pain and pressure  Also complaining postnasal drip right ear discomfort  Refers to nonproductive cough but denies shortness of breath  Describes low-grade fever of 100 a currently taking ibuprofen  Last dose was yesterday  She has also been experiencing some diarrhea for the past week denies any blood in his stool  She has a known history IBS  She expresses concern because she was recently treated back in February for sinus infection  Review of Systems   Review of Systems   Constitutional: Positive for fatigue and fever  HENT: Positive for congestion and postnasal drip  Respiratory: Positive for cough  Gastrointestinal: Positive for abdominal pain and diarrhea  Neurological: Positive for headaches           Current Medications       Current Outpatient Medications:     meclizine (ANTIVERT) 25 mg tablet, Take 1 tablet (25 mg total) by mouth 3 (three) times a day as needed for dizziness, Disp: 30 tablet, Rfl: 5    benzonatate (TESSALON PERLES) 100 mg capsule, Take 1 capsule (100 mg total) by mouth 3 (three) times a day as needed for cough, Disp: 20 capsule, Rfl: 0    fluticasone (FLONASE) 50 mcg/act nasal spray, 2 sprays into each nostril daily, Disp: 1 Bottle, Rfl: 0    JUNEL FE 1/20 1-20 MG-MCG per tablet, TAKE 1 TABLET BY MOUTH EVERY DAY (Patient not taking: Reported on 4/6/2021), Disp: 84 tablet, Rfl: 3    QUEtiapine (SEROquel) 50 mg tablet, Take 100 mg by mouth daily at bedtime , Disp: , Rfl:     Current Facility-Administered Medications:     cyanocobalamin injection 1,000 mcg, 1,000 mcg, Intramuscular, Q30 Days, KATINA Cho, 1,000 mcg at 08/25/20 1517    Current Allergies     Allergies as of 04/06/2021 - Reviewed 04/06/2021   Allergen Reaction Noted    Azithromycin Abdominal Pain 09/21/2017            The following portions of the patient's history were reviewed and updated as appropriate: allergies, current medications, past family history, past medical history, past social history, past surgical history and problem list      Past Medical History:   Diagnosis Date    Bipolar 1 disorder ([facility] [address] )     Psychiatric disorder     bipolar     Vertigo        History reviewed  No pertinent surgical history  Family History   Problem Relation Age of Onset    Anxiety disorder Family     Other Family         cardiac disorder    Diabetes Family     Hypertension Family     Breast cancer Family     Colon cancer Neg Hx     Colon polyps Neg Hx          Medications have been verified  Objective   Ht 5' 7" (1 702 m)   Wt 104 kg (230 lb)   LMP  (LMP Unknown)   BMI 36 02 kg/m²   No LMP recorded (lmp unknown)  (Menstrual status: Birth Control)  Physical Exam     Physical Exam  Vitals signs and nursing note reviewed  Constitutional:       Appearance: Normal appearance     HENT:      Right Ear: Tympanic membrane normal       Left Ear: Tympanic membrane normal       Nose:      Comments: Mildly erythematous hypertrophic left turbinates  No sinus pain elicited     Mouth/Throat:      Mouth: Mucous membranes are moist       Comments: Cobblestoning observed posterior pharynx  Neck:      Musculoskeletal: Normal range of motion  Cardiovascular:      Rate and Rhythm: Normal rate  Pulmonary:      Effort: Pulmonary effort is normal       Breath sounds: Normal breath sounds  Neurological:      Mental Status: She is alert

## 2021-04-07 NOTE — PATIENT INSTRUCTIONS
Afebrile  I prescribed fluticasone nasal spray- 2 sprays in each nostril once daily, Tessalon Perles 100 milligram q 8 hours  Advised patient continue ibuprofen as needed  Increase fluid intake  If symptoms persist or worsen, she is to follow up with primary care provider  She expressed understanding  Allergic Rhinitis   WHAT YOU NEED TO KNOW:   Allergic rhinitis, or hay fever, is swelling of the inside of your nose  The swelling is a reaction to allergens in the air  An allergen can be anything that causes an allergic reaction  Allergies to weeds, grass, trees, or mold often cause seasonal allergic rhinitis  Indoor dust mites, cockroaches, pet dander, or mold can also cause allergic rhinitis  DISCHARGE INSTRUCTIONS:   Call 911 for the following:   · You have chest pain or shortness of breath  Return to the emergency department if:   · You have severe pain  · You cough up blood  Contact your healthcare provider if:   · You have a fever  · You have ear or sinus pain, or a headache  · Your symptoms get worse, even after treatment  · You have yellow, green, brown, or bloody mucus coming from your nose  · Your nose is bleeding or you have pain inside your nose  · You have trouble sleeping because of your symptoms  · You have questions or concerns about your condition or care  Medicines:   · Medicines  help decrease your symptoms and clear your stuffy nose  · Take your medicine as directed  Contact your healthcare provider if you think your medicine is not helping or if you have side effects  Tell him of her if you are allergic to any medicine  Keep a list of the medicines, vitamins, and herbs you take  Include the amounts, and when and why you take them  Bring the list or the pill bottles to follow-up visits  Carry your medicine list with you in case of an emergency      How to manage allergic rhinitis:  The best way to manage allergic rhinitis is to avoid allergens that can trigger your symptoms  Any of the following may help decrease your symptoms:  · Rinse your nose and sinuses  with a salt water solution or use a salt water nasal spray  This will help thin the mucus in your nose and rinse away pollen and dirt  It will also help reduce swelling so you can breathe normally  Ask your healthcare provider how often to rinse your nose  · Reduce exposure to dust mites  Wash sheets and towels in hot water every week  Cover your pillows and mattresses with allergen-free covers  Limit the number of stuffed animals and soft toys your child has  Wash your child's toys in hot water regularly  Vacuum weekly and use a vacuum  with an air filter  If possible, get rid of carpets and curtains  These collect dust and dust mites  · Reduce exposure to pollen  Keep windows and doors closed in your house and car  Stay inside when air pollution or the pollen count is high  Run your air conditioner on recycle, and change air filters often  Shower and wash your hair before bed every night to rinse away pollen  · Reduce exposure to pet dander  If possible, do not keep cats, dogs, birds, or other pets  If you do keep pets in your home, keep them out of bedrooms and carpeted rooms  Bathe them often  · Reduce exposure to mold  Do not spend time in basements  Choose artificial plants instead of live plants  Keep your home's humidity at less than 45%  Do not have ponds or standing water in your home or yard  · Do not smoke  Avoid others who smoke  Ask your healthcare provider for information if you currently smoke and need help to quit  Follow up with your healthcare provider as directed: You may need to see an allergist often to control your symptoms  Write down your questions so you remember to ask them during your visits    © Copyright [address] Information is for End User's use only and may not be sold, redistributed or otherwise used for commercial purposes  All illustrations and images included in CareNotes® are the copyrighted property of [employer]  or Carolina Marcelino  The above information is an  only  It is not intended as medical advice for individual conditions or treatments  Talk to your doctor, nurse or pharmacist before following any medical regimen to see if it is safe and effective for you

## 2021-04-10 ENCOUNTER — HOSPITAL ENCOUNTER (OUTPATIENT)
Dept: ULTRASOUND IMAGING | Facility: HOSPITAL | Age: 21
Discharge: HOME | End: 2021-04-10
Payer: COMMERCIAL

## 2021-04-10 DIAGNOSIS — E28.1 ANDROGEN EXCESS: ICD-10-CM

## 2021-04-10 PROCEDURE — 76770 US EXAM ABDO BACK WALL COMP: CPT

## 2021-04-14 ENCOUNTER — HOSPITAL ENCOUNTER (OUTPATIENT)
Dept: ULTRASOUND IMAGING | Facility: CLINIC | Age: 21
Discharge: HOME | End: 2021-04-14
Payer: COMMERCIAL

## 2021-04-14 VITALS — WEIGHT: 230 LBS | BODY MASS INDEX: 36.1 KG/M2 | HEIGHT: 67 IN

## 2021-04-14 DIAGNOSIS — N64.4 MASTODYNIA: ICD-10-CM

## 2021-04-14 PROCEDURE — 76642 ULTRASOUND BREAST LIMITED: CPT

## 2021-04-29 DIAGNOSIS — J30.9 ALLERGIC RHINITIS, UNSPECIFIED SEASONALITY, UNSPECIFIED TRIGGER: ICD-10-CM

## 2021-04-29 RX ORDER — FLUTICASONE PROPIONATE 50 MCG
SPRAY, SUSPENSION (ML) NASAL
Qty: 16 ML | OUTPATIENT
Start: 2021-04-29

## 2021-05-01 DIAGNOSIS — J30.9 ALLERGIC RHINITIS, UNSPECIFIED SEASONALITY, UNSPECIFIED TRIGGER: ICD-10-CM

## 2021-05-01 RX ORDER — FLUTICASONE PROPIONATE 50 MCG
2 SPRAY, SUSPENSION (ML) NASAL DAILY
Qty: 1 BOTTLE | Refills: 5 | Status: SHIPPED | OUTPATIENT
Start: 2021-05-01 | End: 2022-02-09

## 2021-06-15 ENCOUNTER — TELEPHONE (OUTPATIENT)
Dept: FAMILY MEDICINE CLINIC | Facility: CLINIC | Age: 21
End: 2021-06-15

## 2021-06-15 NOTE — TELEPHONE ENCOUNTER
Pt has a uti was wondering I she has to come into the office or if a medication could be sent in last appt was on 01/22      #[phone]

## 2021-06-15 NOTE — TELEPHONE ENCOUNTER
Send to       Pavan Zendejas [phone]          Pt said if you do send something in to the above pharmacy please as well as if by 4 she don't get a call she is just going to go get checked out at an Urgent care

## 2021-06-15 NOTE — TELEPHONE ENCOUNTER
I am working at [employer] today and just got to my messages  Patient would need to schedule appointment unless she has already gone to urgent care

## 2021-06-24 ENCOUNTER — HOSPITAL ENCOUNTER (EMERGENCY)
Facility: HOSPITAL | Age: 21
Discharge: HOME | End: 2021-06-24
Attending: EMERGENCY MEDICINE
Payer: COMMERCIAL

## 2021-06-24 ENCOUNTER — APPOINTMENT (EMERGENCY)
Dept: CT IMAGING | Facility: HOSPITAL | Age: 21
End: 2021-06-24
Payer: COMMERCIAL

## 2021-06-24 ENCOUNTER — OFFICE VISIT (OUTPATIENT)
Dept: URGENT CARE | Facility: MEDICAL CENTER | Age: 21
End: 2021-06-24
Payer: COMMERCIAL

## 2021-06-24 VITALS
HEIGHT: 67 IN | SYSTOLIC BLOOD PRESSURE: 130 MMHG | TEMPERATURE: 97.5 F | OXYGEN SATURATION: 100 % | WEIGHT: 230 LBS | BODY MASS INDEX: 36.1 KG/M2 | RESPIRATION RATE: 16 BRPM | HEART RATE: 80 BPM | DIASTOLIC BLOOD PRESSURE: 69 MMHG

## 2021-06-24 VITALS
HEART RATE: 81 BPM | RESPIRATION RATE: 16 BRPM | OXYGEN SATURATION: 98 % | SYSTOLIC BLOOD PRESSURE: 118 MMHG | DIASTOLIC BLOOD PRESSURE: 76 MMHG | TEMPERATURE: 97.5 F

## 2021-06-24 DIAGNOSIS — K58.9 IBS (IRRITABLE BOWEL SYNDROME): ICD-10-CM

## 2021-06-24 DIAGNOSIS — R10.9 ABDOMINAL PAIN: Primary | ICD-10-CM

## 2021-06-24 DIAGNOSIS — R10.31 RIGHT LOWER QUADRANT ABDOMINAL PAIN: Primary | ICD-10-CM

## 2021-06-24 LAB
ALBUMIN SERPL BCP-MCNC: 4.4 G/DL (ref 3.5–5)
ALP SERPL-CCNC: 92 U/L (ref 46–116)
ALT SERPL W P-5'-P-CCNC: 23 U/L (ref 12–78)
AMORPH URATE CRY URNS QL MICRO: NORMAL /HPF
ANION GAP SERPL CALCULATED.3IONS-SCNC: 8 MMOL/L (ref 4–13)
AST SERPL W P-5'-P-CCNC: 14 U/L (ref 5–45)
BACTERIA UR QL AUTO: NORMAL /HPF
BASOPHILS # BLD AUTO: 0.05 THOUSANDS/ΜL (ref 0–0.1)
BASOPHILS NFR BLD AUTO: 1 % (ref 0–1)
BILIRUB SERPL-MCNC: 0.5 MG/DL (ref 0.2–1)
BILIRUB UR QL STRIP: NEGATIVE
BUN SERPL-MCNC: 11 MG/DL (ref 5–25)
CALCIUM SERPL-MCNC: 9.1 MG/DL (ref 8.3–10.1)
CHLORIDE SERPL-SCNC: 107 MMOL/L (ref 100–108)
CLARITY UR: CLEAR
CO2 SERPL-SCNC: 26 MMOL/L (ref 21–32)
COLOR UR: YELLOW
CREAT SERPL-MCNC: 0.96 MG/DL (ref 0.6–1.3)
EOSINOPHIL # BLD AUTO: 0.04 THOUSAND/ΜL (ref 0–0.61)
EOSINOPHIL NFR BLD AUTO: 1 % (ref 0–6)
ERYTHROCYTE [DISTWIDTH] IN BLOOD BY AUTOMATED COUNT: 12.2 % (ref 11.6–15.1)
EXT PREG TEST URINE: NEGATIVE
EXT. CONTROL ED NAV: NORMAL
GFR SERPL CREATININE-BSD FRML MDRD: 85 ML/MIN/1.73SQ M
GLUCOSE SERPL-MCNC: 85 MG/DL (ref 65–140)
GLUCOSE UR STRIP-MCNC: NEGATIVE MG/DL
HCT VFR BLD AUTO: 41.1 % (ref 34.8–46.1)
HGB BLD-MCNC: 14.1 G/DL (ref 11.5–15.4)
HGB UR QL STRIP.AUTO: ABNORMAL
IMM GRANULOCYTES # BLD AUTO: 0.01 THOUSAND/UL (ref 0–0.2)
IMM GRANULOCYTES NFR BLD AUTO: 0 % (ref 0–2)
KETONES UR STRIP-MCNC: NEGATIVE MG/DL
LEUKOCYTE ESTERASE UR QL STRIP: NEGATIVE
LIPASE SERPL-CCNC: 89 U/L (ref 73–393)
LYMPHOCYTES # BLD AUTO: 2.28 THOUSANDS/ΜL (ref 0.6–4.47)
LYMPHOCYTES NFR BLD AUTO: 32 % (ref 14–44)
MCH RBC QN AUTO: 31.5 PG (ref 26.8–34.3)
MCHC RBC AUTO-ENTMCNC: 34.3 G/DL (ref 31.4–37.4)
MCV RBC AUTO: 92 FL (ref 82–98)
MONOCYTES # BLD AUTO: 0.57 THOUSAND/ΜL (ref 0.17–1.22)
MONOCYTES NFR BLD AUTO: 8 % (ref 4–12)
MUCOUS THREADS UR QL AUTO: NORMAL
NEUTROPHILS # BLD AUTO: 4.08 THOUSANDS/ΜL (ref 1.85–7.62)
NEUTS SEG NFR BLD AUTO: 58 % (ref 43–75)
NITRITE UR QL STRIP: NEGATIVE
NON-SQ EPI CELLS URNS QL MICRO: NORMAL /HPF
NRBC BLD AUTO-RTO: 0 /100 WBCS
PH UR STRIP.AUTO: 6 [PH]
PLATELET # BLD AUTO: 341 THOUSANDS/UL (ref 149–390)
PMV BLD AUTO: 9.9 FL (ref 8.9–12.7)
POTASSIUM SERPL-SCNC: 4.1 MMOL/L (ref 3.5–5.3)
PROT SERPL-MCNC: 8.1 G/DL (ref 6.4–8.2)
PROT UR STRIP-MCNC: NEGATIVE MG/DL
RBC # BLD AUTO: 4.48 MILLION/UL (ref 3.81–5.12)
RBC #/AREA URNS AUTO: NORMAL /HPF
SL AMB  POCT GLUCOSE, UA: ABNORMAL
SL AMB LEUKOCYTE ESTERASE,UA: ABNORMAL
SL AMB POCT BILIRUBIN,UA: ABNORMAL
SL AMB POCT BLOOD,UA: ABNORMAL
SL AMB POCT CLARITY,UA: CLEAR
SL AMB POCT COLOR,UA: YELLOW
SL AMB POCT KETONES,UA: ABNORMAL
SL AMB POCT NITRITE,UA: ABNORMAL
SL AMB POCT PH,UA: 6
SL AMB POCT SPECIFIC GRAVITY,UA: 1.02
SL AMB POCT URINE HCG: NEGATIVE
SL AMB POCT URINE PROTEIN: ABNORMAL
SL AMB POCT UROBILINOGEN: 0.2
SODIUM SERPL-SCNC: 141 MMOL/L (ref 136–145)
SP GR UR STRIP.AUTO: 1.02 (ref 1–1.03)
UROBILINOGEN UR QL STRIP.AUTO: 0.2 E.U./DL
WBC # BLD AUTO: 7.03 THOUSAND/UL (ref 4.31–10.16)
WBC #/AREA URNS AUTO: NORMAL /HPF

## 2021-06-24 PROCEDURE — 85025 COMPLETE CBC W/AUTO DIFF WBC: CPT | Performed by: EMERGENCY MEDICINE

## 2021-06-24 PROCEDURE — 87591 N.GONORRHOEAE DNA AMP PROB: CPT | Performed by: PHYSICIAN ASSISTANT

## 2021-06-24 PROCEDURE — 87491 CHLMYD TRACH DNA AMP PROBE: CPT | Performed by: PHYSICIAN ASSISTANT

## 2021-06-24 PROCEDURE — 81002 URINALYSIS NONAUTO W/O SCOPE: CPT | Performed by: PHYSICIAN ASSISTANT

## 2021-06-24 PROCEDURE — 80053 COMPREHEN METABOLIC PANEL: CPT | Performed by: EMERGENCY MEDICINE

## 2021-06-24 PROCEDURE — 81025 URINE PREGNANCY TEST: CPT | Performed by: EMERGENCY MEDICINE

## 2021-06-24 PROCEDURE — G0382 LEV 3 HOSP TYPE B ED VISIT: HCPCS | Performed by: PHYSICIAN ASSISTANT

## 2021-06-24 PROCEDURE — 36415 COLL VENOUS BLD VENIPUNCTURE: CPT | Performed by: EMERGENCY MEDICINE

## 2021-06-24 PROCEDURE — 81025 URINE PREGNANCY TEST: CPT | Performed by: PHYSICIAN ASSISTANT

## 2021-06-24 PROCEDURE — 87086 URINE CULTURE/COLONY COUNT: CPT | Performed by: PHYSICIAN ASSISTANT

## 2021-06-24 PROCEDURE — G1004 CDSM NDSC: HCPCS

## 2021-06-24 PROCEDURE — 96374 THER/PROPH/DIAG INJ IV PUSH: CPT

## 2021-06-24 PROCEDURE — 81001 URINALYSIS AUTO W/SCOPE: CPT | Performed by: EMERGENCY MEDICINE

## 2021-06-24 PROCEDURE — 99284 EMERGENCY DEPT VISIT MOD MDM: CPT | Performed by: EMERGENCY MEDICINE

## 2021-06-24 PROCEDURE — 99284 EMERGENCY DEPT VISIT MOD MDM: CPT

## 2021-06-24 PROCEDURE — 83690 ASSAY OF LIPASE: CPT | Performed by: EMERGENCY MEDICINE

## 2021-06-24 PROCEDURE — 74177 CT ABD & PELVIS W/CONTRAST: CPT

## 2021-06-24 RX ORDER — ESTRADIOL 2 MG/1
TABLET ORAL
COMMUNITY
Start: 2021-05-21 | End: 2022-02-09

## 2021-06-24 RX ORDER — KETOROLAC TROMETHAMINE 30 MG/ML
15 INJECTION, SOLUTION INTRAMUSCULAR; INTRAVENOUS ONCE
Status: COMPLETED | OUTPATIENT
Start: 2021-06-24 | End: 2021-06-24

## 2021-06-24 RX ORDER — MEDROXYPROGESTERONE ACETATE 10 MG/1
TABLET ORAL
COMMUNITY
Start: 2021-05-21 | End: 2022-02-09

## 2021-06-24 RX ADMIN — KETOROLAC TROMETHAMINE 15 MG: 30 INJECTION, SOLUTION INTRAMUSCULAR; INTRAVENOUS at 19:59

## 2021-06-24 RX ADMIN — IOHEXOL 100 ML: 350 INJECTION, SOLUTION INTRAVENOUS at 20:19

## 2021-06-24 NOTE — Clinical Note
Liddie Phoenix was seen and treated in our emergency department on 6/24/2021  Diagnosis:     [place]    She may return on this date: If you have any questions or concerns, please don't hesitate to call        Uri Stanley,     ______________________________           _______________          _______________  Hospital Representative                              Date                                Time

## 2021-06-24 NOTE — ED PROVIDER NOTES
History  Chief Complaint   Patient presents with    Abdominal Pain     Pt sent here from urgent care for abdominal pain  67-year-old female presents from urgent care for further evaluation of right-sided abdominal pain  Patient states that she has a history of intermittent right-sided abdominal pain which is sometimes in the right upper quadrant radiating around her right shoulder however today she is more tender in her right lower quadrant  The patient has had some anorexia  The patient also complains of some painful anal bleeding  She denies any abnormal vaginal discharge although she has had some bleeding  The patient has had previous evaluation with CT scan which has been unremarkable  Abdominal Pain  Associated symptoms: no chest pain, no chills, no diarrhea, no dysuria, no fatigue, no fever, no nausea, no shortness of breath, no sore throat and no vomiting        Prior to Admission Medications   Prescriptions Last Dose Informant Patient Reported? Taking?    JUNEL FE 1/20 1-20 MG-MCG per tablet   No No   Sig: TAKE 1 TABLET BY MOUTH EVERY DAY   Patient not taking: Reported on 2021   QUEtiapine (SEROquel) 50 mg tablet  Self Yes No   Sig: Take 100 mg by mouth daily at bedtime    Patient not taking: Reported on 2021   benzonatate (TESSALON PERLES) 100 mg capsule   No No   Sig: Take 1 capsule (100 mg total) by mouth 3 (three) times a day as needed for cough   Patient not taking: Reported on 2021   estradiol (ESTRACE) 2 MG tablet   Yes No   Patient not taking: Reported on 2021   fluticasone (FLONASE) 50 mcg/act nasal spray   No No   Si sprays into each nostril daily   Patient not taking: Reported on 2021   meclizine (ANTIVERT) 25 mg tablet   No No   Sig: Take 1 tablet (25 mg total) by mouth 3 (three) times a day as needed for dizziness   medroxyPROGESTERone (PROVERA) 10 mg tablet   Yes No      Facility-Administered Medications Last Administration Doses Remaining cyanocobalamin injection 1,000 mcg 8/25/2020  3:17 PM           Past Medical History:   Diagnosis Date    Bipolar 1 disorder ([facility] [address] )     Psychiatric disorder     bipolar     Vertigo        History reviewed  No pertinent surgical history  Family History   Problem Relation Age of Onset    Anxiety disorder Family     Other Family         cardiac disorder    Diabetes Family     Hypertension Family     Breast cancer Family     Breast cancer Maternal Grandmother 61    Brain cancer Maternal Grandfather     Breast cancer Paternal Grandmother 61    Lung cancer Paternal Grandfather     Colon cancer Neg Hx     Colon polyps Neg Hx      I have reviewed and agree with the history as documented  E-Cigarette/Vaping    E-Cigarette Use Former User      E-Cigarette/Vaping Substances    Nicotine Yes     THC No     CBD No     Flavoring Yes      Social History     Tobacco Use    Smoking status: Former Smoker    Smokeless tobacco: Never Used   Vaping Use    Vaping Use: Former    Substances: Nicotine, Flavoring   Substance Use Topics    Alcohol use: Never    Drug use: Yes     Types: Marijuana     Comment: Medical MJ       Review of Systems   Constitutional: Positive for appetite change  Negative for chills, fatigue and fever  HENT: Negative for sore throat  Eyes: Negative for visual disturbance  Respiratory: Negative for shortness of breath  Cardiovascular: Negative for chest pain  Gastrointestinal: Positive for abdominal pain, anal bleeding and rectal pain  Negative for diarrhea, nausea and vomiting  Genitourinary: Negative for difficulty urinating, dysuria and pelvic pain  Musculoskeletal: Negative for back pain  Skin: Negative for rash  Neurological: Negative for syncope, weakness and headaches  All other systems reviewed and are negative  Physical Exam  Physical Exam  Vitals and nursing note reviewed  Constitutional:       General: She is not in acute distress       Appearance: She is well-developed  HENT:      Head: Normocephalic and atraumatic  Right Ear: External ear normal       Left Ear: External ear normal    Eyes:      General: No scleral icterus  Conjunctiva/sclera: Conjunctivae normal       Pupils: Pupils are equal, round, and reactive to light  Cardiovascular:      Rate and Rhythm: Normal rate and regular rhythm  Heart sounds: Normal heart sounds  Pulmonary:      Effort: Pulmonary effort is normal  No respiratory distress  Breath sounds: Normal breath sounds  Abdominal:      General: Bowel sounds are normal       Palpations: Abdomen is soft  Tenderness: There is abdominal tenderness in the right lower quadrant  There is no right CVA tenderness, left CVA tenderness, guarding or rebound  Positive signs include McBurney's sign  Negative signs include Merchant's sign  Musculoskeletal:         General: Normal range of motion  Cervical back: Normal range of motion  Skin:     General: Skin is warm and dry  Findings: No rash  Neurological:      Mental Status: She is alert and oriented to person, place, and time           Vital Signs  ED Triage Vitals   Temperature Pulse Respirations Blood Pressure SpO2   06/24/21 1845 06/24/21 1845 06/24/21 1848 06/24/21 1845 06/24/21 1845   97 5 °F (36 4 °C) 98 17 129/68 99 %      Temp Source Heart Rate Source Patient Position - Orthostatic VS BP Location FiO2 (%)   06/24/21 1845 06/24/21 1848 06/24/21 1848 06/24/21 1848 --   Tympanic Monitor Lying Right arm       Pain Score       06/24/21 1959       6           Vitals:    06/24/21 1848 06/24/21 2000 06/24/21 2030 06/24/21 2105   BP: 129/68 121/79 116/56 130/69   Pulse: 83 65 74 80   Patient Position - Orthostatic VS: Lying Lying Lying Lying         Visual Acuity      ED Medications  Medications   ketorolac (TORADOL) injection 15 mg (15 mg Intravenous Given 6/24/21 1959)   iohexol (OMNIPAQUE) 350 MG/ML injection (SINGLE-DOSE) 100 mL (100 mL Intravenous Given 6/24/21 2019)       Diagnostic Studies  Results Reviewed     Procedure Component Value Units Date/Time    Urine Microscopic [[id number]] Collected: 06/24/21 1909    Lab Status: Final result Specimen: Urine, Clean Catch Updated: 06/24/21 2003     RBC, UA None Seen /hpf      WBC, UA None Seen /hpf      Epithelial Cells Occasional /hpf      Bacteria, UA Occasional /hpf      AMORPH URATES Occasional /hpf      MUCUS THREADS None Seen    Comprehensive metabolic panel [[id number]] Collected: 06/24/21 1909    Lab Status: Final result Specimen: Blood from Arm, Right Updated: 06/24/21 1954     Sodium 141 mmol/L      Potassium 4 1 mmol/L      Chloride 107 mmol/L      CO2 26 mmol/L      ANION GAP 8 mmol/L      BUN 11 mg/dL      Creatinine 0 96 mg/dL      Glucose 85 mg/dL      Calcium 9 1 mg/dL      AST 14 U/L      ALT 23 U/L      Alkaline Phosphatase 92 U/L      Total Protein 8 1 g/dL      Albumin 4 4 g/dL      Total Bilirubin 0 50 mg/dL      eGFR 85 ml/min/1 73sq m     Narrative:      [place] guidelines for Chronic Kidney Disease (CKD):     Stage 1 with normal or high GFR (GFR > 90 mL/min/1 73 square meters)    Stage 2 Mild CKD (GFR = 60-89 mL/min/1 73 square meters)    Stage 3A Moderate CKD (GFR = 45-59 mL/min/1 73 square meters)    Stage 3B Moderate CKD (GFR = 30-44 mL/min/1 73 square meters)    Stage 4 Severe CKD (GFR = 15-29 mL/min/1 73 square meters)    Stage 5 End Stage CKD (GFR <15 mL/min/1 73 square meters)  Note: GFR calculation is accurate only with a steady state creatinine    Lipase [[id number]]  (Normal) Collected: 06/24/21 1909    Lab Status: Final result Specimen: Blood from Arm, Right Updated: 06/24/21 1954     Lipase 89 u/L     UA (URINE) with reflex to Scope [[id number]]  (Abnormal) Collected: 06/24/21 1909    Lab Status: Final result Specimen: Urine, Clean Catch Updated: 06/24/21 1925     Color, UA Yellow     Clarity, UA Clear     Specific Gravity, UA 1 025     pH, UA 6 0 Leukocytes, UA Negative     Nitrite, UA Negative     Protein, UA Negative mg/dl      Glucose, UA Negative mg/dl      Ketones, UA Negative mg/dl      Urobilinogen, UA 0 2 E U /dl      Bilirubin, UA Negative     Blood, UA Small    POCT pregnancy, urine [[id number]]  (Normal) Resulted: 06/24/21 1920    Lab Status: Final result Updated: 06/24/21 1921     EXT PREG TEST UR (Ref: Negative) negative     Control Valid    CBC and differential [[id number]] Collected: 06/24/21 1909    Lab Status: Final result Specimen: Blood from Arm, Right Updated: 06/24/21 1918     WBC 7 03 Thousand/uL      RBC 4 48 Million/uL      Hemoglobin 14 1 g/dL      Hematocrit 41 1 %      MCV 92 fL      MCH 31 5 pg      MCHC 34 3 g/dL      RDW 12 2 %      MPV 9 9 fL      Platelets 964 Thousands/uL      nRBC 0 /100 WBCs      Neutrophils Relative 58 %      Immat GRANS % 0 %      Lymphocytes Relative 32 %      Monocytes Relative 8 %      Eosinophils Relative 1 %      Basophils Relative 1 %      Neutrophils Absolute 4 08 Thousands/µL      Immature Grans Absolute 0 01 Thousand/uL      Lymphocytes Absolute 2 28 Thousands/µL      Monocytes Absolute 0 57 Thousand/µL      Eosinophils Absolute 0 04 Thousand/µL      Basophils Absolute 0 05 Thousands/µL                  CT abdomen pelvis with contrast   Final Result by Kaitlynn Fernandez MD (06/24 2038)      No acute pathology visualized on CT of the abdomen and pelvis with IV without oral contrast             Workstation performed: [id number]                    Procedures  Procedures         ED Course                                           MDM  Number of Diagnoses or Management Options  Abdominal pain  IBS (irritable bowel syndrome)  Diagnosis management comments: Patient with intermittent history right quadrant/right upper quadrant pain  The patient has right lower quadrant pain today which is concerning for appendicitis versus colitis, mesenteric adenitis, ureteral calculus  I also consider acute cholecystitis  Patient does not have pelvic pain or tenderness suggestive of ovarian etiology    Diagnostics were discussed reviewed with the patient  Discussed plan for discharge and outpatient follow-up    The patient (and any family present) verbalized understanding of the discharge instructions and warnings that would necessitate return to the Emergency Department  All questions were answered prior to discharge  Amount and/or Complexity of Data Reviewed  Clinical lab tests: reviewed  Tests in the radiology section of CPT®: reviewed  Independent visualization of images, tracings, or specimens: yes        Disposition  Final diagnoses:   Abdominal pain   IBS (irritable bowel syndrome)     Time reflects when diagnosis was documented in both MDM as applicable and the Disposition within this note     Time User Action Codes Description Comment    6/24/2021  8:59 PM De Sleigh Add [R10 9] Abdominal pain     6/24/2021  8:59 PM De Sleigh Add [K58 9] IBS (irritable bowel syndrome)       ED Disposition     ED Disposition Condition Date/Time Comment    Discharge Stable u Jun 24, 2021  8:59 PM Dennie Lamer discharge to home/self care              Follow-up Information     Follow up With Specialties Details Why Contact Info Additional [address]  [facility] Schedule an appointment as soon as possible for a visit  For further evaluation [address] [facility] [address] [facility] [id number] [address] [place] [facility] [address], [address] [facility], [address] [phone]    Izabella Riley MD Family Medicine Schedule an appointment as soon as possible for a visit  For further evaluation [address] [facility] [address]  [address] [facility] [address] [phone]             Discharge Medication List as of 6/24/2021  8:59 PM      CONTINUE these medications which have NOT CHANGED    Details   benzonatate (TESSALON PERLES) 100 mg capsule Take 1 capsule (100 mg total) by mouth 3 (three) times a day as needed for cough, Starting Tue 4/6/2021, Normal      estradiol (ESTRACE) 2 MG tablet Starting Fri 5/21/2021, Historical Med      fluticasone (FLONASE) 50 mcg/act nasal spray 2 sprays into each nostril daily, Starting Sat 5/1/2021, Normal      JUNEL FE 1/20 1-20 MG-MCG per tablet TAKE 1 TABLET BY MOUTH EVERY DAY, Normal      meclizine (ANTIVERT) 25 mg tablet Take 1 tablet (25 mg total) by mouth 3 (three) times a day as needed for dizziness, Starting Thu 10/15/2020, Normal      medroxyPROGESTERone (PROVERA) 10 mg tablet Starting Fri 5/21/2021, Historical Med      QUEtiapine (SEROquel) 50 mg tablet Take 100 mg by mouth daily at bedtime , Historical Med           No discharge procedures on file      PDMP Review     None          ED Provider  Electronically Signed by           Maile Porras DO  06/24/21 0923

## 2021-06-24 NOTE — PROGRESS NOTES
[address]        NAME: Nagi Ashby is a 22 y o  female  : 2000    MRN: [id number]  DATE: 2021  TIME: 4:48 PM    Assessment and Plan   Right lower quadrant abdominal pain [R10 31]  1  Right lower quadrant abdominal pain  POCT urine dip    POCT urine HCG    Urine culture    Chlamydia/GC amplified DNA by PCR    Transfer to other facility         Patient Instructions        go to ER      Chief Complaint     Chief Complaint   Patient presents with    Abdominal Pain     Patient relates started with mid abdominal pain for 1 month  Denies nausea, vomiting and diarrhea  Denies fever  "I have not had my period for 8 months, I have PCOs and endomitriosis" C/O "abnormal vaginal bleeding yesterday and Left sided chest pain on/off for 1 week and shortness of breath  Tested for COVID yesterday at her job and "negative"         History of Present Illness        41-year-old female presents with abdominal pain  Patient reports she has had abdominal pain for the past month however the past couple days worsening of abdominal pain  She reports worsening of pain is more on the right side of her abdomen  Loss of appetite and had some diarrhea which she said that there might have been some blood in there  Patient reports she thinks the blood is due to potentially a hemorrhoid but has never been checked for that  Patient also has complained of some vaginal bleeding that started yesterday  She reports she has not had her period for the past 8 months  Patient does have a history of PCOS and endometriosis  Abdominal Pain  This is a new problem  The current episode started 1 to 4 weeks ago  The onset quality is gradual  The problem occurs constantly  The problem has been gradually worsening  The pain is located in the generalized abdominal region  The pain is severe  The quality of the pain is sharp  The abdominal pain does not radiate  Associated symptoms include anorexia, diarrhea and hematochezia  Pertinent negatives include no constipation, fever, frequency, headaches, nausea or vomiting  The pain is aggravated by certain positions and eating  The pain is relieved by nothing  She has tried nothing for the symptoms  The treatment provided no relief  There is no history of abdominal surgery  Review of Systems   Review of Systems   Constitutional: Negative  Negative for fever  HENT: Negative  Eyes: Negative  Respiratory: Negative  Cardiovascular: Negative  Gastrointestinal: Positive for abdominal pain, anorexia, blood in stool, diarrhea and hematochezia  Negative for constipation, nausea and vomiting  Genitourinary: Negative for frequency  Musculoskeletal: Negative  Skin: Negative  Neurological: Negative  Negative for headaches           Current Medications       Current Outpatient Medications:     meclizine (ANTIVERT) 25 mg tablet, Take 1 tablet (25 mg total) by mouth 3 (three) times a day as needed for dizziness, Disp: 30 tablet, Rfl: 5    medroxyPROGESTERone (PROVERA) 10 mg tablet, , Disp: , Rfl:     benzonatate (TESSALON PERLES) 100 mg capsule, Take 1 capsule (100 mg total) by mouth 3 (three) times a day as needed for cough (Patient not taking: Reported on 6/24/2021), Disp: 20 capsule, Rfl: 0    estradiol (ESTRACE) 2 MG tablet, , Disp: , Rfl:     fluticasone (FLONASE) 50 mcg/act nasal spray, 2 sprays into each nostril daily (Patient not taking: Reported on 6/24/2021), Disp: 1 Bottle, Rfl: 5    JUNEL FE 1/20 1-20 MG-MCG per tablet, TAKE 1 TABLET BY MOUTH EVERY DAY (Patient not taking: Reported on 4/6/2021), Disp: 84 tablet, Rfl: 3    QUEtiapine (SEROquel) 50 mg tablet, Take 100 mg by mouth daily at bedtime  (Patient not taking: Reported on 6/24/2021), Disp: , Rfl:     Current Facility-Administered Medications:     cyanocobalamin injection 1,000 mcg, 1,000 mcg, Intramuscular, Q30 Days, KATINA Reid, 1,000 mcg at 08/25/20 1517    Current Allergies     Allergies as of 06/24/2021 - Reviewed 06/24/2021   Allergen Reaction Noted    Azithromycin Abdominal Pain 09/21/2017            The following portions of the patient's history were reviewed and updated as appropriate: allergies, current medications, past family history, past medical history, past social history, past surgical history and problem list      Past Medical History:   Diagnosis Date    Bipolar 1 disorder ([address] )     Psychiatric disorder     bipolar     Vertigo        History reviewed  No pertinent surgical history  Family History   Problem Relation Age of Onset    Anxiety disorder Family     Other Family         cardiac disorder    Diabetes Family     Hypertension Family     Breast cancer Family     Breast cancer Maternal Grandmother 61    Brain cancer Maternal Grandfather     Breast cancer Paternal Grandmother 61    Lung cancer Paternal Grandfather     Colon cancer Neg Hx     Colon polyps Neg Hx          Medications have been verified  Objective   /76   Pulse 81   Temp 97 5 °F (36 4 °C) (Tympanic)   Resp 16   SpO2 98%   No LMP recorded  Physical Exam     Physical Exam  Vitals and nursing note reviewed  Constitutional:       General: She is not in acute distress  Appearance: She is well-developed  HENT:      Head: Normocephalic and atraumatic  Right Ear: Hearing, tympanic membrane, ear canal and external ear normal       Left Ear: Hearing, tympanic membrane, ear canal and external ear normal       Nose: Nose normal       Mouth/Throat:      Pharynx: Uvula midline  No oropharyngeal exudate  Eyes:      General:         Right eye: No discharge  Left eye: No discharge  Conjunctiva/sclera: Conjunctivae normal    Cardiovascular:      Rate and Rhythm: Normal rate and regular rhythm  Heart sounds: Normal heart sounds  No murmur heard  Pulmonary:      Effort: Pulmonary effort is normal  No respiratory distress        Breath sounds: Normal breath sounds  No wheezing or rales  Abdominal:      General: Bowel sounds are normal       Palpations: Abdomen is soft  Tenderness: There is abdominal tenderness (Moderate to severe) in the right lower quadrant  There is guarding  There is no right CVA tenderness, left CVA tenderness or rebound  Positive signs include Rovsing's sign and McBurney's sign  Negative signs include Merchant's sign and psoas sign  Musculoskeletal:         General: Normal range of motion  Cervical back: Normal range of motion and neck supple  Lymphadenopathy:      Cervical: No cervical adenopathy  Skin:     General: Skin is warm and dry  Neurological:      Mental Status: She is alert and oriented to person, place, and time            concerned about possible appendicitis sent to  ER for evaluation

## 2021-06-25 LAB
BACTERIA UR CULT: NORMAL
C TRACH DNA SPEC QL NAA+PROBE: NEGATIVE
N GONORRHOEA DNA SPEC QL NAA+PROBE: NEGATIVE

## 2021-06-30 ENCOUNTER — VBI (OUTPATIENT)
Dept: FAMILY MEDICINE CLINIC | Facility: CLINIC | Age: 21
End: 2021-06-30

## 2021-06-30 NOTE — TELEPHONE ENCOUNTER
Franklin Fariasnicolabrain    ED Visit Information     Ed visit date: 6/24/2021  Diagnosis Description: Abdominal pain; IBS (irritable bowel syndrome)  In Network? Yes [address]  Discharge status: Home  Discharged with meds ? No  Number of ED visits to date: 2  ED Severity:NA     Outreach Information    Outreach successful: Yes 1  Date letter mailed: NA  Date Finalized:6/30/2021    Care Coordination    Follow up appointment with pcp: no Declined  Transportation issues ? No    [facility] [employer] type: 7 Day Outreach  Emergent necessity warranted by diagnosis: No  [facility] PCP: Yes  Called PCP first?: No  Feels able to call PCP for urgent problems ?: Yes  Understands what emergencies can be handled by PCP ?: Yes  Ever any problems getting appointment with PCP for minor emergency/urgency problems?: No  Practice Contacted Patient ?: No  Pt had ED follow up with pcp/staff ?: No    Seen for follow-up out of network ?: No  Reason Patient went to ED instead of Urgent Care or PCP?: Perceived Severity of Illness, Proximity  06/30/2021 04:05 PM Phone ([facility]) Ksenia Hernandez (Self) [phone] (H)   Left Message  Unable to reach patient regarding her recent ED visit on 6/25/2021 for Abdominal pain; IBS (irritable bowel syndrome)  2nd attempt will be made on 6/24/2021 06/30/2021 04:34 PM Phone ([facility]) Ksenia Hernandez (Self) [phone] (H)   Returned call  Personal communication with patient regarding her recent ED visit on 6/25/2021 for Abdominal pain; IBS (irritable bowel syndrome)  Patient stated that she is feeling better and declined a follow up appt with PCP  Patient stated that she scheduled to see GI in September  She stated that she has had this pain for a long time and can wait until then

## 2021-08-27 ENCOUNTER — OFFICE VISIT (OUTPATIENT)
Dept: FAMILY MEDICINE CLINIC | Facility: CLINIC | Age: 21
End: 2021-08-27
Payer: COMMERCIAL

## 2021-08-27 VITALS
BODY MASS INDEX: 36.1 KG/M2 | HEIGHT: 67 IN | SYSTOLIC BLOOD PRESSURE: 118 MMHG | OXYGEN SATURATION: 98 % | TEMPERATURE: 97.4 F | DIASTOLIC BLOOD PRESSURE: 86 MMHG | HEART RATE: 96 BPM | WEIGHT: 230 LBS

## 2021-08-27 DIAGNOSIS — F43.10 PTSD (POST-TRAUMATIC STRESS DISORDER): ICD-10-CM

## 2021-08-27 DIAGNOSIS — Z00.00 WELLNESS EXAMINATION: ICD-10-CM

## 2021-08-27 DIAGNOSIS — F31.9 BIPOLAR 1 DISORDER (HCC): ICD-10-CM

## 2021-08-27 DIAGNOSIS — R42 VERTIGO: ICD-10-CM

## 2021-08-27 DIAGNOSIS — E34.8 PINEAL GLAND CYST: Primary | ICD-10-CM

## 2021-08-27 PROCEDURE — 1036F TOBACCO NON-USER: CPT | Performed by: FAMILY MEDICINE

## 2021-08-27 PROCEDURE — 99395 PREV VISIT EST AGE 18-39: CPT | Performed by: FAMILY MEDICINE

## 2021-08-27 PROCEDURE — 3008F BODY MASS INDEX DOCD: CPT | Performed by: FAMILY MEDICINE

## 2021-08-27 RX ORDER — MECLIZINE HYDROCHLORIDE 25 MG/1
25 TABLET ORAL 3 TIMES DAILY PRN
Qty: 30 TABLET | Refills: 5 | Status: SHIPPED | OUTPATIENT
Start: 2021-08-27 | End: 2022-02-09

## 2021-08-27 RX ORDER — ACETAMINOPHEN 500 MG
500 TABLET ORAL EVERY 6 HOURS PRN
COMMUNITY
Start: 2021-08-23 | End: 2021-09-02

## 2021-08-27 NOTE — PROGRESS NOTES
HPI:  Tavia Jama is a 22 y o  female here for her yearly health maintenance exam    Patient Active Problem List   Diagnosis    Lower abdominal pain    Diarrhea    [facility]    Pineal gland cyst    Vertigo     Past Medical History:   Diagnosis Date    [facility] ([facility]     Psychiatric disorder     bipolar     Vertigo        1  Advanced Directive: n     2  Durable Power of  for Healthcare: n     3  Social History:           Drug and alcohol History: n                  4  Immunizations up to date: y                 Lifestyle:                           Healthy Diet:y                          Alcohol Use:n                          Tobacco Use:n                          Regular exercise:y                          Weight concerns:y                               5   Over the past 2 weeks, how often have you been bothered by the following:              Little interest or pleasure in doing things:n              Felling down, depressed or hopeless:n       Current Outpatient Medications   Medication Sig Dispense Refill    acetaminophen (TYLENOL) 500 mg tablet Take 500 mg by mouth every 6 (six) hours as needed      meclizine (ANTIVERT) 25 mg tablet Take 1 tablet (25 mg total) by mouth 3 (three) times a day as needed for dizziness 30 tablet 5    benzonatate (TESSALON PERLES) 100 mg capsule Take 1 capsule (100 mg total) by mouth 3 (three) times a day as needed for cough (Patient not taking: Reported on 8/27/2021) 20 capsule 0    estradiol (ESTRACE) 2 MG tablet  (Patient not taking: Reported on 8/27/2021)      fluticasone (FLONASE) 50 mcg/act nasal spray 2 sprays into each nostril daily (Patient not taking: Reported on 8/27/2021) 1 Bottle 5    JUNEL FE 1/20 1-20 MG-MCG per tablet TAKE 1 TABLET BY MOUTH EVERY DAY (Patient not taking: Reported on 8/27/2021) 84 tablet 3    medroxyPROGESTERone (PROVERA) 10 mg tablet  (Patient not taking: Reported on 8/27/2021)      QUEtiapine (SEROquel) 50 mg tablet Take 100 mg by mouth daily at bedtime  (Patient not taking: Reported on 8/27/2021)       Current Facility-Administered Medications   Medication Dose Route Frequency Provider Last Rate Last Admin    cyanocobalamin injection 1,000 mcg  1,000 mcg Intramuscular Q30 Days KATINA Bruner   1,000 mcg at 08/25/20 1517     Allergies   Allergen Reactions    Azithromycin Abdominal Pain     Immunization History   Administered Date(s) Administered    DTP 2001, 2001, 2001, 07/29/2002    DTaP 5 01/31/2006    HPV Quadrivalent 10/25/2017    HPV9 03/30/2017, 06/22/2017    Hep A, adult 08/28/2008, 01/12/2012    Hep B, adult 2001, 2001, 2001    Hib (PRP-OMP) 2001, 2001, 2001, 07/29/2002    INFLUENZA 12/11/2008, 01/15/2009, 10/17/2018    IPV 2001, 2001, 07/29/2002, 01/28/2005    Influenza Quadrivalent Preservative Free 3 years and older IM 10/12/2017    MMR 05/06/2002, 01/31/2006    Meningococcal, Unknown Serogroups 01/12/2012, 07/12/2017    Tdap 2001, 2001, 07/29/2002, 01/12/2012    Tuberculin Skin Test-PPD Intradermal 08/06/2019    Varicella 01/28/2002, 02/08/2007       Patient Care Team:  Kat Zamudio MD as PCP - General  KATINA Cruz    Review of Systems   Constitutional: Negative for fatigue, fever and unexpected weight change  HENT: Negative for congestion, sinus pain and sore throat  Eyes: Negative for visual disturbance  Respiratory: Negative for shortness of breath and wheezing  Cardiovascular: Negative for chest pain and palpitations  Gastrointestinal: Negative for abdominal pain, nausea and vomiting  Musculoskeletal: Negative  Negative for arthralgias and myalgias  Neurological: Negative for syncope, weakness and numbness  Psychiatric/Behavioral: Negative  Negative for confusion, dysphoric mood and suicidal ideas         Physical Exam :  Physical Exam  Constitutional:       Appearance: She is well-developed  HENT:      Right Ear: Ear canal normal  Tympanic membrane is not injected  Left Ear: Ear canal normal  Tympanic membrane is not injected  Nose: Nose normal    Eyes:      General:         Right eye: No discharge  Left eye: No discharge  Conjunctiva/sclera: Conjunctivae normal       Pupils: Pupils are equal, round, and reactive to light  Neck:      Thyroid: No thyromegaly  Cardiovascular:      Rate and Rhythm: Normal rate and regular rhythm  Heart sounds: Normal heart sounds  No murmur heard  Pulmonary:      Effort: Pulmonary effort is normal  No respiratory distress  Breath sounds: Normal breath sounds  No wheezing  Abdominal:      General: Bowel sounds are normal  There is no distension  Palpations: Abdomen is soft  Tenderness: There is no abdominal tenderness  Musculoskeletal:         General: Normal range of motion  Cervical back: Normal range of motion and neck supple  Lymphadenopathy:      Cervical: No cervical adenopathy  Skin:     General: Skin is warm and dry  Neurological:      Mental Status: She is alert and oriented to person, place, and time  She is not disoriented  Sensory: No sensory deficit  Gait: Gait normal       Deep Tendon Reflexes: Reflexes are normal and symmetric  Psychiatric:         Speech: Speech normal          Behavior: Behavior normal          Thought Content: Thought content normal          Judgment: Judgment normal            Assessment and Plan:  1  Pineal gland cyst     2  Bipolar 1 disorder (HCC)     3  Vertigo  meclizine (ANTIVERT) 25 mg tablet   4   Wellness examination         Health Maintenance Due   Topic Date Due    Hepatitis C Screening  Never done    DTaP,Tdap,and Td Vaccines (4 - Td or Tdap) 07/12/2012    COVID-19 Vaccine (1) Never done    HIV Screening  Never done    BMI: Followup Plan  01/22/2022

## 2022-02-09 ENCOUNTER — OFFICE VISIT (OUTPATIENT)
Dept: FAMILY MEDICINE CLINIC | Facility: CLINIC | Age: 22
End: 2022-02-09
Payer: COMMERCIAL

## 2022-02-09 VITALS
BODY MASS INDEX: 37.65 KG/M2 | OXYGEN SATURATION: 98 % | DIASTOLIC BLOOD PRESSURE: 78 MMHG | WEIGHT: 240.4 LBS | TEMPERATURE: 98 F | HEART RATE: 88 BPM | SYSTOLIC BLOOD PRESSURE: 120 MMHG

## 2022-02-09 DIAGNOSIS — Z23 NEED FOR INFLUENZA VACCINATION: ICD-10-CM

## 2022-02-09 DIAGNOSIS — Z23 NEED FOR TETANUS, DIPHTHERIA, AND ACELLULAR PERTUSSIS (TDAP) VACCINE: ICD-10-CM

## 2022-02-09 DIAGNOSIS — E28.2 PCOS (POLYCYSTIC OVARIAN SYNDROME): Primary | ICD-10-CM

## 2022-02-09 DIAGNOSIS — R10.9 FLANK PAIN: ICD-10-CM

## 2022-02-09 DIAGNOSIS — E78.2 MIXED HYPERLIPIDEMIA: ICD-10-CM

## 2022-02-09 LAB
SL AMB  POCT GLUCOSE, UA: NEGATIVE
SL AMB LEUKOCYTE ESTERASE,UA: NEGATIVE
SL AMB POCT BILIRUBIN,UA: NEGATIVE
SL AMB POCT BLOOD,UA: NEGATIVE
SL AMB POCT CLARITY,UA: CLEAR
SL AMB POCT COLOR,UA: ABNORMAL
SL AMB POCT KETONES,UA: ABNORMAL
SL AMB POCT NITRITE,UA: NEGATIVE
SL AMB POCT PH,UA: 6.5
SL AMB POCT SPECIFIC GRAVITY,UA: 1.03
SL AMB POCT URINE PROTEIN: NEGATIVE
SL AMB POCT UROBILINOGEN: NEGATIVE

## 2022-02-09 PROCEDURE — 81002 URINALYSIS NONAUTO W/O SCOPE: CPT | Performed by: FAMILY MEDICINE

## 2022-02-09 PROCEDURE — 90715 TDAP VACCINE 7 YRS/> IM: CPT | Performed by: FAMILY MEDICINE

## 2022-02-09 PROCEDURE — 90471 IMMUNIZATION ADMIN: CPT | Performed by: FAMILY MEDICINE

## 2022-02-09 PROCEDURE — 99214 OFFICE O/P EST MOD 30 MIN: CPT | Performed by: FAMILY MEDICINE

## 2022-02-09 PROCEDURE — 3725F SCREEN DEPRESSION PERFORMED: CPT | Performed by: FAMILY MEDICINE

## 2022-02-09 PROCEDURE — 90472 IMMUNIZATION ADMIN EACH ADD: CPT | Performed by: FAMILY MEDICINE

## 2022-02-09 PROCEDURE — 1036F TOBACCO NON-USER: CPT | Performed by: FAMILY MEDICINE

## 2022-02-09 PROCEDURE — 90686 IIV4 VACC NO PRSV 0.5 ML IM: CPT | Performed by: FAMILY MEDICINE

## 2022-02-09 RX ORDER — METFORMIN HYDROCHLORIDE 500 MG/1
500 TABLET, EXTENDED RELEASE ORAL 2 TIMES DAILY WITH MEALS
Qty: 180 TABLET | Refills: 3 | Status: SHIPPED | OUTPATIENT
Start: 2022-02-09

## 2022-02-09 NOTE — PROGRESS NOTES
Assessment/Plan:    No problem-specific Assessment & Plan notes found for this encounter  Diagnoses and all orders for this visit:    PCOS (polycystic ovarian syndrome)  -     metFORMIN (GLUCOPHAGE-XR) 500 mg 24 hr tablet; Take 1 tablet (500 mg total) by mouth 2 (two) times a day with meals    Mixed hyperlipidemia  -     Comprehensive metabolic panel; Future  -     Lipid Panel with Direct LDL reflex; Future  -     TSH, 3rd generation with Free T4 reflex; Future  -     T4, free; Future  -     Comprehensive metabolic panel  -     Lipid Panel with Direct LDL reflex  -     TSH, 3rd generation with Free T4 reflex  -     T4, free          Subjective:   Chief Complaint   Patient presents with    Follow-up     Discuss vaccines    Flank Pain     left - would like to chack urine        Patient ID: Kali May is a 25 y o  female  F/u PCOS    Flank Pain  Pertinent negatives include no abdominal pain, chest pain, fever, numbness or weakness  The following portions of the patient's history were reviewed and updated as appropriate: allergies, current medications, past family history, past medical history, past social history, past surgical history and problem list     Review of Systems   Constitutional: Negative for fatigue, fever and unexpected weight change  HENT: Negative for congestion, sinus pain and sore throat  Eyes: Negative for visual disturbance  Respiratory: Negative for shortness of breath and wheezing  Cardiovascular: Negative for chest pain and palpitations  Gastrointestinal: Negative for abdominal pain, nausea and vomiting  Genitourinary: Positive for flank pain  Musculoskeletal: Negative for arthralgias and myalgias  Neurological: Negative for syncope, weakness and numbness  Psychiatric/Behavioral: Negative  Negative for confusion, dysphoric mood and suicidal ideas           Objective:  Vitals:    02/09/22 1101   BP: 120/78   BP Location: Left arm   Patient Position: Sitting Cuff Size: Large   Pulse: 88   Temp: 98 °F (36 7 °C)   TempSrc: Oral   SpO2: 98%   Weight: 109 kg (240 lb 6 4 oz)      Physical Exam  Constitutional:       Appearance: She is well-developed  HENT:      Right Ear: Ear canal normal  Tympanic membrane is not injected  Left Ear: Ear canal normal  Tympanic membrane is not injected  Nose: Nose normal    Eyes:      General:         Right eye: No discharge  Left eye: No discharge  Conjunctiva/sclera: Conjunctivae normal       Pupils: Pupils are equal, round, and reactive to light  Neck:      Thyroid: No thyromegaly  Cardiovascular:      Rate and Rhythm: Normal rate and regular rhythm  Heart sounds: Normal heart sounds  No murmur heard  Pulmonary:      Effort: Pulmonary effort is normal  No respiratory distress  Breath sounds: Normal breath sounds  No wheezing  Abdominal:      General: Bowel sounds are normal  There is no distension  Palpations: Abdomen is soft  Tenderness: There is no abdominal tenderness  Musculoskeletal:         General: Normal range of motion  Cervical back: Normal range of motion and neck supple  Lymphadenopathy:      Cervical: No cervical adenopathy  Skin:     General: Skin is warm and dry  Neurological:      Mental Status: She is alert and oriented to person, place, and time  She is not disoriented  Sensory: No sensory deficit  Gait: Gait normal       Deep Tendon Reflexes: Reflexes are normal and symmetric  Psychiatric:         Speech: Speech normal          Behavior: Behavior normal          Thought Content:  Thought content normal          Judgment: Judgment normal

## 2022-03-01 NOTE — TELEPHONE ENCOUNTER
Approve      Pt due HM/MM
Patient with necrotic wound on left ischium with associated cellulitis  Would cover with Vancomycin / Cefepime / Metronidazole  Cultures were sent from bedside debridement by surgery  Would check MRSA/MSSA PCR  Previous year MRSA PCR positive for MSSA    I will continue to follow. Please feel free to contact me with any further questions.    Hao Torres M.D.  [facility] Division of Infectious Disease  8AM-5PM Monday - Friday: Available on [employer] Teams  After Hours and Holidays (or if no response on [employer] Teams): Please contact the Infectious Diseases Office at [phone]    The above assessment and plan were discussed with emergency department team
Patient interviewed and examined w/ the renal fellow and the student. Sister present during evaluation. HEATHER from sepsis w/ worsening creatinine, non oliguric, K is OK.  Sister and RN report altered mental status, with visual hallucinations and myoclonus on exam.  Received Oxycodone 10 mg.  Also has history of sleep apnea but refuses CPAP.  Toxic metabolic encephalopathy due to opioids, r/o hypercapnia Uremia also in the differential diagnosis. Would check venous blood gas.
Pt seen and examined with ACP.  Assessment and plan discussed. Agree with above.  Status of wound and treatment recommendations discussed with patient and sister at length.  All questions answered.  Both pt and sister expressed understanding.
Patient seen and examined.  Agree with resident note as above.  Patient with hx as noted including morbid obesity, diastolic and valvular heart failure,  chronic hypoxic resp failure, who presents to ER with worsening pain and odor of gluteal area.  In the ER had brief hypoTN that resolved with IVF.  In the ER, surgery performed I&D of buttock area with malodorous pus discharge.  s/p abx.    Patient is now awake and alert, conversant, not dyspneic, with SBP consistently >90.  No need for MICU.  Recs as above and I have edited as appropriate.
Briefly, 68F with h/o severe AS s/p TAVR'15 (Ramon 23mm) and Suzy TAVR in 2021, pAF on Xarelto, JOSR (does not tolerate CPAP, on home 2L O2), morbid obesity s/p gastric band c/b gastric perforation, and DVT/PE who p/w sepsis on 2/26 2/2 sacral ulcer requiring debridement in the ED. She was treated with IV antibiotic and Bcx has been negative thus far. Per sister, has not felt well in past several years despite TAVR. Barely able to walk without SOB. Since admission, had worsening renal function (of note, baseline Cr 0.92 which increased to 4.6; possibly related to CT abdom/pelvis w/ contrast). BNP 19k (up from prior). On exam, morbidly obese, JVP approx 14 cm with HJR, crackles b/l, RRR, no m/r/g, nontender abdomen, trace edema b/l, asterixis.   - lasix 80 mg IV x1  - may require HD given likely uremic  - c/w lopressor 100 mg q12  - c/w abx

## 2022-05-30 ENCOUNTER — APPOINTMENT (EMERGENCY)
Dept: RADIOLOGY | Facility: HOSPITAL | Age: 22
End: 2022-05-30
Payer: COMMERCIAL

## 2022-05-30 ENCOUNTER — HOSPITAL ENCOUNTER (EMERGENCY)
Facility: HOSPITAL | Age: 22
Discharge: HOME | End: 2022-05-30
Attending: EMERGENCY MEDICINE | Admitting: EMERGENCY MEDICINE
Payer: COMMERCIAL

## 2022-05-30 VITALS
HEART RATE: 75 BPM | OXYGEN SATURATION: 98 % | RESPIRATION RATE: 16 BRPM | DIASTOLIC BLOOD PRESSURE: 69 MMHG | TEMPERATURE: 98.4 F | SYSTOLIC BLOOD PRESSURE: 137 MMHG

## 2022-05-30 DIAGNOSIS — S93.401A RIGHT ANKLE SPRAIN: Primary | ICD-10-CM

## 2022-05-30 PROCEDURE — 99283 EMERGENCY DEPT VISIT LOW MDM: CPT

## 2022-05-30 PROCEDURE — 73610 X-RAY EXAM OF ANKLE: CPT

## 2022-05-30 PROCEDURE — 73590 X-RAY EXAM OF LOWER LEG: CPT

## 2022-05-30 PROCEDURE — 99284 EMERGENCY DEPT VISIT MOD MDM: CPT | Performed by: EMERGENCY MEDICINE

## 2022-05-30 RX ORDER — ACETAMINOPHEN 325 MG/1
975 TABLET ORAL ONCE
Status: COMPLETED | OUTPATIENT
Start: 2022-05-30 | End: 2022-05-30

## 2022-05-30 RX ADMIN — ACETAMINOPHEN 975 MG: 325 TABLET, FILM COATED ORAL at 06:32

## 2022-05-30 NOTE — ED PROVIDER NOTES
History  Chief Complaint   Patient presents with    Ankle Injury     Pt c/o right ankle pain, rolled ankle last night  Swollen, unable to move     HPI  21-year-old female presents a complaint right ankle pain  Last night at approximately 11:00 p m  The patient was walking down hill in her backyard when she misstepped and twisted her ankle  Patient fell but states she did not hit her head she did not have any loss of consciousness, nausea, vomiting, numbness and tingling in the lower extremity, weakness in the foot  Patient states that she is unable to bear weight due to the pain  The pain is located on medial and lateral aspects of her ankle is associated with swelling  Rest makes the pain better palpation and pressure makes pain worse  Prior to Admission Medications   Prescriptions Last Dose Informant Patient Reported? Taking?   metFORMIN (GLUCOPHAGE-XR) 500 mg 24 hr tablet   No No   Sig: Take 1 tablet (500 mg total) by mouth 2 (two) times a day with meals      Facility-Administered Medications Last Administration Doses Remaining   cyanocobalamin injection 1,000 mcg 8/25/2020  3:17 PM           Past Medical History:   Diagnosis Date    Bipolar 1 disorder ([facility] )     Psychiatric disorder     bipolar     Vertigo        History reviewed  No pertinent surgical history  Family History   Problem Relation Age of Onset    Anxiety disorder Family     Other Family         cardiac disorder    Diabetes Family     Hypertension Family     Breast cancer Family     Breast cancer Maternal Grandmother 61    Brain cancer Maternal Grandfather     Breast cancer Paternal Grandmother 61    Lung cancer Paternal Grandfather     Colon cancer Neg Hx     Colon polyps Neg Hx      I have reviewed and agree with the history as documented      E-Cigarette/Vaping    E-Cigarette Use Former User     Quit Date 8/10/22      E-Cigarette/Vaping Substances    Nicotine Yes     THC No     CBD No     Flavoring Yes      Social History     Tobacco Use    Smoking status: Never Smoker    Smokeless tobacco: Never Used   Vaping Use    Vaping Use: Former    Quit date: 8/10/2022    Substances: Nicotine, Flavoring   Substance Use Topics    Alcohol use: Yes     Comment: 1 - 2 drinks monthly    Drug use: Yes     Types: Marijuana     Comment: Medical MJ        Review of Systems   Constitutional: Negative for chills and fever  HENT: Negative for congestion, ear pain, rhinorrhea and sore throat  Eyes: Negative for pain  Respiratory: Negative for apnea, cough, choking, chest tightness, shortness of breath, wheezing and stridor  Cardiovascular: Negative for chest pain, palpitations and leg swelling  Gastrointestinal: Negative for abdominal pain, constipation, diarrhea, nausea and vomiting  Genitourinary: Negative for hematuria  Musculoskeletal: Negative for arthralgias and back pain  Skin: Negative for rash and wound  Neurological: Negative for dizziness  Psychiatric/Behavioral: Negative for agitation and hallucinations  All other systems reviewed and are negative  Physical Exam  ED Triage Vitals [05/30/22 0607]   Temperature Pulse Respirations Blood Pressure SpO2   98 4 °F (36 9 °C) 75 16 137/69 98 %      Temp Source Heart Rate Source Patient Position - Orthostatic VS BP Location FiO2 (%)   Oral Monitor Sitting Left arm --      Pain Score       10 - Worst Possible Pain             Orthostatic Vital Signs  Vitals:    05/30/22 0607   BP: 137/69   Pulse: 75   Patient Position - Orthostatic VS: Sitting       Physical Exam  Vitals reviewed  Constitutional:       General: She is not in acute distress  Appearance: She is well-developed  HENT:      Head: Normocephalic and atraumatic  Right Ear: External ear normal       Left Ear: External ear normal       Nose: Nose normal  No congestion or rhinorrhea        Mouth/Throat:      Mouth: Mucous membranes are moist       Pharynx: No oropharyngeal exudate or posterior oropharyngeal erythema  Eyes:      General:         Right eye: No discharge  Left eye: No discharge  Extraocular Movements: Extraocular movements intact  Conjunctiva/sclera: Conjunctivae normal       Pupils: Pupils are equal, round, and reactive to light  Cardiovascular:      Rate and Rhythm: Normal rate and regular rhythm  Pulses: Normal pulses  Heart sounds: Normal heart sounds  Pulmonary:      Effort: Pulmonary effort is normal  No respiratory distress  Breath sounds: Normal breath sounds  No wheezing or rales  Abdominal:      Palpations: Abdomen is soft  Tenderness: There is no abdominal tenderness  Musculoskeletal:         General: Swelling and tenderness present  Normal range of motion  Cervical back: Normal range of motion and neck supple  No rigidity  Comments: Swelling of the right ankle  Tenderness to palpation of the medial and lateral aspects of the ankle tenderness to palpation of the fibular head  Lower extremity is neurovascularly intact bilaterally  Skin:     General: Skin is warm  Findings: No erythema or rash  Neurological:      General: No focal deficit present  Mental Status: She is alert and oriented to person, place, and time  Cranial Nerves: No cranial nerve deficit  Sensory: No sensory deficit  Motor: No weakness  Coordination: Coordination normal    Psychiatric:         Mood and Affect: Mood normal          ED Medications  Medications   acetaminophen (TYLENOL) tablet 975 mg (975 mg Oral Given 5/30/22 9827)       Diagnostic Studies  Results Reviewed     None                 XR tibia fibula 2 views RIGHT   ED Interpretation by Melissa Alvarado MD (05/30 7791)   No fracture  Final Result by Kandace Fontanez MD (05/30 6908)      No acute osseous abnormality              Workstation performed: [id number]         XR ankle 3+ views RIGHT   ED Interpretation by Melissa Alvarado MD (05/30 4518)   No fracture  Final Result by Laverne High MD (05/30 1128)      No acute osseous abnormality  Workstation performed: [id number]               Procedures  Procedures      ED Course                             SBIRT 20yo+    Flowsheet Row Most Recent Value   SBIRT (23 yo +)    In order to provide better care to our patients, we are screening all of our patients for alcohol and drug use  Would it be okay to ask you these screening questions? No Filed at: 05/30/2022 0612                [facility]  Number of Diagnoses or Management Options  Right ankle sprain  Diagnosis management comments: 71-year-old female presents a complaint right ankle pain  Last night at approximately 11:00 p m  Investigate ankle fracture/fibular fracture         Amount and/or Complexity of Data Reviewed  Tests in the radiology section of CPT®: ordered        Disposition  Final diagnoses:   Right ankle sprain     Time reflects when diagnosis was documented in both MDM as applicable and the Disposition within this note     Time User Action Codes Description Comment    5/30/2022  7:01 AM Dillon Graft Add [[id number] [id number]] Right ankle sprain       ED Disposition     ED Disposition   Discharge    Condition   Stable    Date/Time   Mon May 30, 2022  7:01 AM    Comment   Jemima Maria discharge to home/self care                 Follow-up Information     Follow up With Specialties Details Why Contact Info Additional [address] Emergency Department Emergency Medicine Go to  If symptoms worsen or if you have additional concerns [address] [phone]  [address] Emergency Department, [address] [place], [place], [address] Jammie Charlton MD Family Medicine Schedule an appointment as soon as possible for a visit   [address] [facility] 2  [address] [facility]   [address] [phone]  [address] [facility] [place], [address] [facility] [address] [place], [place], [address]  [facility]  Use Entrance A     Physical Therapy at [facility] [address] [facility] Physical Therapy Schedule an appointment as soon as possible for a visit   [address]  [phone] Physical Therapy at [address] [place] [address], [address]          Discharge Medication List as of 5/30/2022  7:09 AM      CONTINUE these medications which have NOT CHANGED    Details   metFORMIN (GLUCOPHAGE-XR) 500 mg 24 hr tablet Take 1 tablet (500 mg total) by mouth 2 (two) times a day with meals, Starting Wed 2/9/2022, Normal           No discharge procedures on file  PDMP Review     None           ED Provider  Attending physically available and evaluated Oziel Hook I managed the patient along with the ED Attending      Electronically Signed by         Edel Patterson DO  06/01/22 1825

## 2022-05-30 NOTE — ED ATTENDING ATTESTATION
5/30/2022  IChapo MD, saw and evaluated the patient  I have discussed the patient with the resident/non-physician practitioner and agree with the resident's/non-physician practitioner's findings, Plan of Care, and MDM as documented in the resident's/non-physician practitioner's note, except where noted  All available labs and Radiology studies were reviewed  I was present for key portions of any procedure(s) performed by the resident/non-physician practitioner and I was immediately available to provide assistance  At this point I agree with the current assessment done in the Emergency Department  I have conducted an independent evaluation of this patient a history and physical is as follows:    ED Course     Patient reports that she was walking down a hill when she lost her footing causing her to twist her right ankle fall  The patient has pain to the distal aspect of the right tib fib along the outside portion as well right lateral and medial malleolus  Patient states that she has been unable to bear weight  On examination the patient has some swelling with possible deformity of the right ankle  Right knee is ranged without pain  Palpation over the proximal fibula is nontender  Tibia is nontender  From the middle of the fibula down through the lateral malleolus, the patient has tenderness without crepitance  Compartments are soft  Patient is tender to palpation over the posterior aspect of the medial and lateral malleolus  Dorsalis pedis and posterior tibial pulse are intact  Sensation intact along the foot  Nontender to palpation of the base of the 5th metatarsal     XR tibia fibula 2 views RIGHT   ED Interpretation   No fracture  XR ankle 3+ views RIGHT   ED Interpretation   No fracture                Critical Care Time  Procedures

## 2022-05-30 NOTE — Clinical Note
Livier Erin was seen and treated in our emergency department on 5/30/2022  Diagnosis:     Daisy Paganna    She may return on this date: 05/31/2022         If you have any questions or concerns, please don't hesitate to call        Lele Bryant, DO    ______________________________           _______________          _______________  Hospital Representative                              Date                                Time

## 2022-05-30 NOTE — Clinical Note
Vincenzo Estevez was seen and treated in our emergency department on 5/30/2022  Diagnosis:     Aneesh Mills    She may return on this date: 05/31/2022         If you have any questions or concerns, please don't hesitate to call        Osborn Gilford, DO    ______________________________           _______________          _______________  Hospital Representative                              Date                                Time

## 2022-05-30 NOTE — Clinical Note
Kathy Imandonovan was seen and treated in our emergency department on 5/30/2022  Diagnosis:     Orquidea Tubbs    She may return on this date: 05/31/2022         If you have any questions or concerns, please don't hesitate to call        Julia Schwab, DO    ______________________________           _______________          _______________  Hospital Representative                              Date                                Time

## 2022-05-30 NOTE — Clinical Note
Ila Mayer was seen and treated in our emergency department on 5/30/2022  Diagnosis:     Vishnu Taveras    She may return on this date: 06/02/2022         If you have any questions or concerns, please don't hesitate to call        Bao Neely DO    ______________________________           _______________          _______________  Hospital Representative                              Date                                Time

## 2022-05-30 NOTE — DISCHARGE INSTRUCTIONS
Please follow-up with your primary care doctor or ortho for further management of your ankle sprain  Please make an appointment with physical therapy

## 2022-05-31 ENCOUNTER — TELEPHONE (OUTPATIENT)
Dept: FAMILY MEDICINE CLINIC | Facility: CLINIC | Age: 22
End: 2022-05-31

## 2022-05-31 NOTE — TELEPHONE ENCOUNTER
Patient employer called, will be faxing over paperwork for Adrianna Cox to fill out on duties that patient can perform-patient scheduled f/u visit for 06/03/2022 for ER f/u

## 2022-06-03 ENCOUNTER — OFFICE VISIT (OUTPATIENT)
Dept: FAMILY MEDICINE CLINIC | Facility: CLINIC | Age: 22
End: 2022-06-03
Payer: COMMERCIAL

## 2022-06-03 VITALS
BODY MASS INDEX: 36.1 KG/M2 | DIASTOLIC BLOOD PRESSURE: 70 MMHG | OXYGEN SATURATION: 98 % | WEIGHT: 230 LBS | HEIGHT: 67 IN | SYSTOLIC BLOOD PRESSURE: 114 MMHG | HEART RATE: 89 BPM | RESPIRATION RATE: 16 BRPM

## 2022-06-03 DIAGNOSIS — S93.401D SPRAIN OF RIGHT ANKLE, UNSPECIFIED LIGAMENT, SUBSEQUENT ENCOUNTER: Primary | ICD-10-CM

## 2022-06-03 PROBLEM — S93.401A SPRAIN OF RIGHT ANKLE: Status: ACTIVE | Noted: 2022-06-03

## 2022-06-03 PROCEDURE — 3008F BODY MASS INDEX DOCD: CPT | Performed by: FAMILY MEDICINE

## 2022-06-03 PROCEDURE — 99214 OFFICE O/P EST MOD 30 MIN: CPT | Performed by: FAMILY MEDICINE

## 2022-06-03 NOTE — PROGRESS NOTES
Assessment/Plan:    Sprain of right ankle  No work ---re-eval 6/8       Diagnoses and all orders for this visit:    Sprain of right ankle, unspecified ligament, subsequent encounter          Subjective:   Chief Complaint   Patient presents with    Follow-up     SLB 05/30/2022 R ankle/foot injury after falling down a hill  Patient ID: Americo Leos is a 25 y o  female  R ankle sprain--severe      The following portions of the patient's history were reviewed and updated as appropriate: allergies, current medications, past family history, past medical history, past social history, past surgical history and problem list     Review of Systems   Constitutional: Negative for fatigue, fever and unexpected weight change  HENT: Negative for congestion, sinus pain and sore throat  Eyes: Negative for visual disturbance  Respiratory: Negative for shortness of breath and wheezing  Cardiovascular: Negative for chest pain and palpitations  Gastrointestinal: Negative for abdominal pain, nausea and vomiting  Musculoskeletal: Negative  Negative for arthralgias and myalgias  Neurological: Negative for syncope, weakness and numbness  Psychiatric/Behavioral: Negative  Negative for confusion, dysphoric mood and suicidal ideas  Objective:  Vitals:    06/03/22 0855   BP: 114/70   Pulse: 89   Resp: 16   SpO2: 98%   Weight: 104 kg (230 lb)   Height: 5' 7" (1 702 m)      Physical Exam  Constitutional:       Appearance: She is well-developed  HENT:      Right Ear: Ear canal normal  Tympanic membrane is not injected  Left Ear: Ear canal normal  Tympanic membrane is not injected  Nose: Nose normal    Eyes:      General:         Right eye: No discharge  Left eye: No discharge  Conjunctiva/sclera: Conjunctivae normal       Pupils: Pupils are equal, round, and reactive to light  Neck:      Thyroid: No thyromegaly  Cardiovascular:      Rate and Rhythm: Normal rate and regular rhythm  Heart sounds: Normal heart sounds  No murmur heard  Pulmonary:      Effort: Pulmonary effort is normal  No respiratory distress  Breath sounds: Normal breath sounds  No wheezing  Abdominal:      General: Bowel sounds are normal  There is no distension  Palpations: Abdomen is soft  Tenderness: There is no abdominal tenderness  Musculoskeletal:         General: Normal range of motion  Cervical back: Normal range of motion and neck supple  Lymphadenopathy:      Cervical: No cervical adenopathy  Skin:     General: Skin is warm and dry  Neurological:      Mental Status: She is alert and oriented to person, place, and time  She is not disoriented  Sensory: No sensory deficit  Gait: Gait normal       Deep Tendon Reflexes: Reflexes are normal and symmetric  Psychiatric:         Speech: Speech normal          Behavior: Behavior normal          Thought Content:  Thought content normal          Judgment: Judgment normal        R ankle swollen/tender palp

## 2022-06-08 ENCOUNTER — OFFICE VISIT (OUTPATIENT)
Dept: FAMILY MEDICINE CLINIC | Facility: CLINIC | Age: 22
End: 2022-06-08
Payer: COMMERCIAL

## 2022-06-08 DIAGNOSIS — S93.409A GRADE 3 ANKLE SPRAIN: Primary | ICD-10-CM

## 2022-06-08 PROCEDURE — 99214 OFFICE O/P EST MOD 30 MIN: CPT | Performed by: FAMILY MEDICINE

## 2022-06-08 NOTE — ASSESSMENT & PLAN NOTE
Pt with ligament tear--grade 3--severe bruising--not cleared for work or wt bearing for 2-4 wks--re-eval 2 wks

## 2022-06-08 NOTE — PROGRESS NOTES
Assessment/Plan:    Grade 3 ankle sprain  Pt with ligament tear--grade 3--severe bruising--not cleared for work or wt bearing for 2-4 wks--re-eval 2 wks       Diagnoses and all orders for this visit:    Grade 3 ankle sprain          Subjective:   No chief complaint on file  Patient ID: Shanice Lockhart is a 25 y o  female  F/u R ankle sprain---not able to bear wt      The following portions of the patient's history were reviewed and updated as appropriate: allergies, current medications, past family history, past medical history, past social history, past surgical history and problem list     Review of Systems   Constitutional: Negative for fatigue, fever and unexpected weight change  HENT: Negative for congestion, sinus pain and sore throat  Eyes: Negative for visual disturbance  Respiratory: Negative for shortness of breath and wheezing  Cardiovascular: Negative for chest pain and palpitations  Gastrointestinal: Negative for abdominal pain, nausea and vomiting  Musculoskeletal: Negative  Negative for arthralgias and myalgias  R ankle pain         Neurological: Negative for syncope, weakness and numbness  Psychiatric/Behavioral: Negative  Negative for confusion, dysphoric mood and suicidal ideas  Objective: There were no vitals filed for this visit  Physical Exam  Constitutional:       Appearance: She is well-developed  HENT:      Right Ear: Ear canal normal  Tympanic membrane is not injected  Left Ear: Ear canal normal  Tympanic membrane is not injected  Nose: Nose normal    Eyes:      General:         Right eye: No discharge  Left eye: No discharge  Conjunctiva/sclera: Conjunctivae normal       Pupils: Pupils are equal, round, and reactive to light  Neck:      Thyroid: No thyromegaly  Cardiovascular:      Rate and Rhythm: Normal rate and regular rhythm  Heart sounds: Normal heart sounds  No murmur heard    Pulmonary:      Effort: Pulmonary effort is normal  No respiratory distress  Breath sounds: Normal breath sounds  No wheezing  Abdominal:      General: Bowel sounds are normal  There is no distension  Palpations: Abdomen is soft  Tenderness: There is no abdominal tenderness  Musculoskeletal:         General: Normal range of motion  Cervical back: Normal range of motion and neck supple  Lymphadenopathy:      Cervical: No cervical adenopathy  Skin:     General: Skin is warm and dry  Neurological:      Mental Status: She is alert and oriented to person, place, and time  She is not disoriented  Sensory: No sensory deficit  Gait: Gait normal       Deep Tendon Reflexes: Reflexes are normal and symmetric  Psychiatric:         Speech: Speech normal          Behavior: Behavior normal          Thought Content:  Thought content normal          Judgment: Judgment normal        R ankle w/ ecchymosis/very tender to palp

## 2022-06-22 ENCOUNTER — OFFICE VISIT (OUTPATIENT)
Dept: FAMILY MEDICINE CLINIC | Facility: CLINIC | Age: 22
End: 2022-06-22
Payer: COMMERCIAL

## 2022-06-22 VITALS
DIASTOLIC BLOOD PRESSURE: 84 MMHG | OXYGEN SATURATION: 98 % | HEIGHT: 67 IN | SYSTOLIC BLOOD PRESSURE: 120 MMHG | TEMPERATURE: 98.2 F | BODY MASS INDEX: 36.02 KG/M2 | HEART RATE: 85 BPM

## 2022-06-22 DIAGNOSIS — S93.409A GRADE 3 ANKLE SPRAIN: Primary | ICD-10-CM

## 2022-06-22 DIAGNOSIS — B37.3 VAGINAL CANDIDIASIS: ICD-10-CM

## 2022-06-22 PROCEDURE — 99214 OFFICE O/P EST MOD 30 MIN: CPT | Performed by: FAMILY MEDICINE

## 2022-06-22 PROCEDURE — 1036F TOBACCO NON-USER: CPT | Performed by: FAMILY MEDICINE

## 2022-06-22 RX ORDER — FLUCONAZOLE 150 MG/1
150 TABLET ORAL ONCE
Qty: 2 TABLET | Refills: 2 | Status: SHIPPED | OUTPATIENT
Start: 2022-06-22 | End: 2022-06-22

## 2022-06-22 NOTE — PROGRESS NOTES
Assessment/Plan:    No problem-specific Assessment & Plan notes found for this encounter  Diagnoses and all orders for this visit:    Grade 3 ankle sprain  -     Ambulatory Referral to Physical Therapy; Future    Vaginal candidiasis  -     fluconazole (DIFLUCAN) 150 mg tablet; Take 1 tablet (150 mg total) by mouth once for 1 dose          Subjective:   Chief Complaint   Patient presents with    Follow-up        Patient ID: Annalisa Maki is a 25 y o  female  F/u ankle sprain- healing with brace  Vaginal pruritus and white vulvar discharge x6 days   Failed monistat  Prior yeast infections ( >1 year ago) treated with only monistat  Patient is not immunosuppressed and has no recent abx use  The following portions of the patient's history were reviewed and updated as appropriate: allergies, current medications, past family history, past medical history, past social history, past surgical history and problem list     Review of Systems   Constitutional: Negative for fatigue, fever and unexpected weight change  HENT: Negative for congestion, sinus pain and sore throat  Eyes: Negative for visual disturbance  Respiratory: Negative for shortness of breath and wheezing  Cardiovascular: Negative for chest pain and palpitations  Gastrointestinal: Negative for abdominal pain, nausea and vomiting  Genitourinary: Positive for vaginal discharge  Negative for decreased urine volume, dysuria, flank pain, genital sores and hematuria  Vulvar pruritis    Musculoskeletal: Negative  Negative for arthralgias and myalgias  Neurological: Negative for syncope, weakness and numbness  Psychiatric/Behavioral: Negative  Negative for confusion, dysphoric mood and suicidal ideas  Objective:  Vitals:    06/22/22 1355   BP: 120/84   Pulse: 85   Temp: 98 2 °F (36 8 °C)   TempSrc: Tympanic   SpO2: 98%   Height: 5' 7" (1 702 m)      Physical Exam  Vitals and nursing note reviewed     Constitutional: General: She is not in acute distress  Appearance: She is well-developed  She is not diaphoretic  HENT:      Right Ear: Tympanic membrane, ear canal and external ear normal  Tympanic membrane is not injected  Left Ear: Tympanic membrane, ear canal and external ear normal  Tympanic membrane is not injected  Nose: Nose normal    Eyes:      General:         Right eye: No discharge  Left eye: No discharge  Conjunctiva/sclera: Conjunctivae normal       Pupils: Pupils are equal, round, and reactive to light  Neck:      Thyroid: No thyromegaly  Cardiovascular:      Rate and Rhythm: Normal rate and regular rhythm  Heart sounds: Normal heart sounds  No murmur heard  Pulmonary:      Effort: Pulmonary effort is normal  No respiratory distress  Breath sounds: Normal breath sounds  No wheezing  Musculoskeletal:      Cervical back: Normal range of motion and neck supple  Feet:      Right foot:      Skin integrity: Skin integrity normal       Comments: R ankle minimal swelling  Bruising resolved  Ambulating without crutches  Lymphadenopathy:      Cervical: No cervical adenopathy

## 2022-07-06 ENCOUNTER — EVALUATION (OUTPATIENT)
Dept: PHYSICAL THERAPY | Facility: CLINIC | Age: 22
End: 2022-07-06
Payer: COMMERCIAL

## 2022-07-06 DIAGNOSIS — S93.401D: Primary | ICD-10-CM

## 2022-07-06 PROCEDURE — 97161 PT EVAL LOW COMPLEX 20 MIN: CPT | Performed by: PHYSICAL THERAPIST

## 2022-07-06 PROCEDURE — 97110 THERAPEUTIC EXERCISES: CPT | Performed by: PHYSICAL THERAPIST

## 2022-07-06 NOTE — PROGRESS NOTES
PT Evaluation     Today's date: 2022  Patient name: Joaquim Lisa  : 2000  MRN: [id number]  Referring provider: Mohit An MD  Dx:   Encounter Diagnosis     ICD-10-CM    1  Supination-adduction injury of right ankle, stage 2, subsequent encounter  S93 401D        Start Time: 3941  Stop Time: 4889  Total time in clinic (min): 43 minutes    Assessment/Plan    This patient is 5 weeks s/p R ankle sprain  Problems include decreased ROM, decreased strength, impaired function and pain  This patient is a good candidate for skilled physical therapy to reduce pain and improve function  Planned Interventions:  manual therapy, ROM, stretching, strengthening, gait and balance training, therapeutic activities, neuromuscular re-ed and instruction in HEP  Frequency and duration: 2 x/week for 6 weeks    STGs: 4 weeks  1  Increase DF ROM to 10 degrees  2  Decrease pain 2-3 levels  3  Increase ankle inversion to 35 degrees  4  Increase ankle eversion to 10 degrees                LTGs: 6 weeks  1  Independent HEP  2  Increase strength R ankle to 5/5 t/o  3  Increase FOTO score to predicted level  4  Able to perform full HR RLE without difficulty  5  Able to return to usual activities without R ankle pain > 0-2/10  6  Able to ascend/ descend stairs w/ good RLE control        Subjective  This is a 25 y o  female who slipped on a hill in her yard on 22, turning her ankle  Initially rested it and iced  Was wearing an aircast and the first few weeks after injury and used a knee scooter  Has been getting better but is still having problems with it  Current symptoms: swelling around the ankle, sharp pain both sides of the ankle and front of ankle, throbbing if on it too long, feels really weak compared to other ankle    Aggravating factors:  Being on feet too much  Relieving factors: rest  Previous treatment: none  Dx tests:  X-rays (-) for fx  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; 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  Leisure:  Hiking, walks dogs, enjoys being outside    [place] goal:to strengthen the R ankle, make it feel equal to the L ankle    Objective  Pain scale: 2-5/10  Integumentary:     Moderate edema R ankle;     Circumference top of medial malleolus:  R 28 cm; L 26cm  Palpation:    Very tender lateral R ankle - TFL  Functional Strength Tests:   SLS  R 3 sec; L WNL  HR  R partial 5x w/ difficulty; L 10x full HR without difficulty  Step overs: poor eccentric control RLE  Functional Mobility     Ambulation:  Able to walk usual distances but with increased pain, swelling     Stairs: able to ascend / descend stairs step over step      right     Hip   AROM  PROM  Strength  flexion   5/5   extension   5/5   Abduction   5/5   adduction   5/5   Knee   AROM  PROM  Strength  Flexion   5/5   Extension   5/5   Ankle   AROM  PROM  Strength  DF 0               12  4+/5   PF 48             50     Inversion 25             40  4+/5   Eversion 2               25  4+/5     left lower extremity ROM / strength WFL           Precautions: none  POC expires: 8/7  Daily Treatment Record  Manuals 7/6            PROM R ankle                                                    Neuro Re-Ed             BAPS                                                                                           Ther Ex             Ankle AROM/ circles instructed            Ankle pumps w/ elevation instructed            Ankle TB 4 way             biodex Wt shift              HR/TR             gastroc stretch                                       Ther Activity                                       Gait Training                                       Modalities                                        7/6/22: issued compressogrip

## 2022-07-12 ENCOUNTER — OFFICE VISIT (OUTPATIENT)
Dept: PHYSICAL THERAPY | Facility: CLINIC | Age: 22
End: 2022-07-12
Payer: COMMERCIAL

## 2022-07-12 DIAGNOSIS — S93.401D: Primary | ICD-10-CM

## 2022-07-12 PROCEDURE — 97110 THERAPEUTIC EXERCISES: CPT | Performed by: PHYSICAL THERAPIST

## 2022-07-12 PROCEDURE — 97112 NEUROMUSCULAR REEDUCATION: CPT | Performed by: PHYSICAL THERAPIST

## 2022-07-12 NOTE — PROGRESS NOTES
Daily Note     Today's date: 2022  Patient name: Nagi Ashby  : 2000  MRN: [id number]  Referring provider: Jean Claude Cadena MD  Dx:   Encounter Diagnosis     ICD-10-CM    1  Supination-adduction injury of right ankle, stage 2, subsequent encounter  S93 401D        Start Time: 4614  Stop Time: 4374  Total time in clinic (min): 46 minutes    Subjective: reports the swelling is down  Objective: See treatment diary below      Assessment: advanced exercises as noted below; tolerated treatment well  Patient would benefit from continued PT      Plan: Continue per plan of care           Precautions: none  POC expires:   Access Code [email or web address]       Daily Treatment Record  Manuals            PROM R ankle  JR                                                  Neuro Re-Ed             BAPS DF/PF and inv/ev  L2 20 ea           BAPS circles  20ea           biodex balance  2min L10           biodex wt shift A-P and M-L  L10 2min                                                               Ther Ex             Ankle AROM/ circles instructed hep           Ankle pumps w/ elevation instructed hep           Ankle TB 4 way  GTB 20ea                         HR/TR  20x           gastroc stretch  30"x3           Soleus stretch  30"x3                        Ther Activity                                       Gait Training                                       Modalities

## 2022-07-14 ENCOUNTER — OFFICE VISIT (OUTPATIENT)
Dept: PHYSICAL THERAPY | Facility: CLINIC | Age: 22
End: 2022-07-14
Payer: COMMERCIAL

## 2022-07-14 DIAGNOSIS — S93.401D: Primary | ICD-10-CM

## 2022-07-14 PROCEDURE — 97140 MANUAL THERAPY 1/> REGIONS: CPT

## 2022-07-14 PROCEDURE — 97112 NEUROMUSCULAR REEDUCATION: CPT

## 2022-07-14 PROCEDURE — 97110 THERAPEUTIC EXERCISES: CPT

## 2022-07-14 NOTE — PROGRESS NOTES
Daily Note     Today's date: 2022  Patient name: Liddie Phoenix  : 2000  MRN: [id number]  Referring provider: Karel Espinal MD  Dx:   Encounter Diagnosis     ICD-10-CM    1  Supination-adduction injury of right ankle, stage 2, subsequent encounter  S95 739D                   Subjective: Pt states she had mild soreness after last session, so she iced it, otherwise felt pretty good  Objective: See treatment diary below      Assessment: Tolerated treatment well  Patient demonstrated fatigue post treatment, exhibited good technique with therapeutic exercises and would benefit from continued PT  Notes stiffness with DF most notably  Notes this morning was the first time she really drove any distance and it "felt weird", in terms of stiffness, but she had no issues  Plan to progress next visit  Plan: Continue per plan of care  Progress treatment as tolerated         Precautions: none  POC expires:   Access Code [email or web address]       Daily Treatment Record  Manuals           PROM R ankle  JR MS                                                 Neuro Re-Ed             BAPS DF/PF and inv/ev  L2 20 ea L2 x20          BAPS circles  20ea x20          biodex balance  2min L10 2' L10          biodex wt shift A-P and M-L  L10 2min 2' L10                                                              Ther Ex             Ankle AROM/ circles instructed hep           Ankle pumps w/ elevation instructed hep           Ankle TB 4 way  GTB 20ea GTB x20 ea                        HR/TR  20x x20          gastroc stretch  30"x3 30"x3          Soleus stretch  30"x3 30"x3                       Ther Activity                                       Gait Training                                       Modalities

## 2022-07-19 ENCOUNTER — APPOINTMENT (OUTPATIENT)
Dept: PHYSICAL THERAPY | Facility: CLINIC | Age: 22
End: 2022-07-19
Payer: COMMERCIAL

## 2022-07-21 ENCOUNTER — APPOINTMENT (OUTPATIENT)
Dept: PHYSICAL THERAPY | Facility: CLINIC | Age: 22
End: 2022-07-21
Payer: COMMERCIAL

## 2022-07-26 ENCOUNTER — OFFICE VISIT (OUTPATIENT)
Dept: PHYSICAL THERAPY | Facility: CLINIC | Age: 22
End: 2022-07-26
Payer: COMMERCIAL

## 2022-07-26 DIAGNOSIS — S93.401D: Primary | ICD-10-CM

## 2022-07-26 PROCEDURE — 97140 MANUAL THERAPY 1/> REGIONS: CPT | Performed by: PHYSICAL THERAPIST

## 2022-07-26 PROCEDURE — 97112 NEUROMUSCULAR REEDUCATION: CPT | Performed by: PHYSICAL THERAPIST

## 2022-07-26 PROCEDURE — 97110 THERAPEUTIC EXERCISES: CPT | Performed by: PHYSICAL THERAPIST

## 2022-07-26 NOTE — PROGRESS NOTES
Daily Note     Today's date: 2022  Patient name: Pawel Farah  : 2000  MRN: [id number]  Referring provider: Raheel Jonas MD  Dx:   Encounter Diagnosis     ICD-10-CM    1  Supination-adduction injury of right ankle, stage 2, subsequent encounter  S93 401D        Start Time: 1102          Subjective: ankle is feeling better; swelling is down; has been doing 30 minute walks daily      Objective: See treatment diary below      Assessment:   Progressed exercises as noted below  Tolerated treatment well  Patient would benefit from continued PT  Plan: Continue per plan of care  Progress treatment as tolerated         Precautions: none  POC expires:   Access Code [email or web address]       Daily Treatment Record  Manuals          PROM R ankle  JR MS JR                                                Neuro Re-Ed             BAPS DF/PF and inv/ev  L2 20 ea L2 x20 L2 20x stand        BAPS circles  20ea x20 20x         biodex balance  2min L10 2' L10 L9 1min         biodex wt shift A-P and M-L  L10 2min 2' L10 L9 1'castillo                                                             Ther Ex             Ankle AROM/ circles instructed hep           Ankle pumps w/ elevation instructed hep           Ankle TB 4 way  GTB 20ea GTB x20 ea GTB 20ea          sls   10"x2 10"x 10 blue         HR/TR  20x x20 20ea         gastroc stretch  30"x3 30"x3 30"x3         Soleus stretch  30"x3 30"x3 30"x3         Step ups 6"    15x         Step downs 6"    15x                      Ther Activity                                       Gait Training                                       Modalities

## 2022-07-28 ENCOUNTER — OFFICE VISIT (OUTPATIENT)
Dept: PHYSICAL THERAPY | Facility: CLINIC | Age: 22
End: 2022-07-28
Payer: COMMERCIAL

## 2022-07-28 DIAGNOSIS — S93.401D: Primary | ICD-10-CM

## 2022-07-28 PROCEDURE — 97112 NEUROMUSCULAR REEDUCATION: CPT | Performed by: PHYSICAL THERAPIST

## 2022-07-28 PROCEDURE — 97110 THERAPEUTIC EXERCISES: CPT | Performed by: PHYSICAL THERAPIST

## 2022-07-28 NOTE — PROGRESS NOTES
Daily Note/  Discharge     22 ADDENDUM:  Patient is ready for DC; could not be reassessed as she cancelled her last visit  Today's date: 2022  Patient name: George Thurman  : 2000  MRN: [id number]  Referring provider: Khalif Riley MD  Dx:   Encounter Diagnosis     ICD-10-CM    1  Supination-adduction injury of right ankle, stage 2, subsequent encounter  S93 401D        Start Time: 0  Stop Time: 1830  Total time in clinic (min): 43 minutes    Subjective: not much pain, just tenderness on outside of ankle      Objective: See treatment diary below      Assessment:  Tolerated treatment well  Strength and functional mobility are improving  Min cueing needed for TB exercises  Patient would benefit from continued PT  Plan: Continue per plan of care  Progress treatment as tolerated  RA        Precautions: none  POC expires:   Access Code [email or web address]       Daily Treatment Record  Manuals         PROM R ankle  JR MS JR JR                                               Neuro Re-Ed             BAPS DF/PF and inv/ev  L2 20 ea L2 x20 L2 20x Stand L2 20x        BAPS circles  20ea x20 20x Stand 20x        biodex balance  2min L10 2' L10 L9 1min L9 2min        biodex wt shift A-P and M-L  L10 2min 2' L10 L9 1'castillo L9 2min        Hip flex/ext/ abd R stance w/ slider     nv                                               Ther Ex             Ankle AROM/ circles instructed hep           Ankle pumps w/ elevation instructed hep           Ankle TB 4 way  GTB 20ea GTB x20 ea GTB 20ea GTB 20x GTB 20x        sls   10"x2 10"x 10 blue 10x10" blue foam Ball toss       HR/TR  20x x20 20ea 10x2 HR R only        gastroc stretch  30"x3 30"x3 30"x3 30"x3        Soleus stretch  30"x3 30"x3 30"x3 30"x3        Step ups 6"    15x 8in 20x        Step downs 6"    15x 8in 20x        Reformer jumps             Ther Activity                                       Gait Training Modalities

## 2022-08-11 ENCOUNTER — OFFICE VISIT (OUTPATIENT)
Dept: FAMILY MEDICINE CLINIC | Facility: CLINIC | Age: 22
End: 2022-08-11
Payer: COMMERCIAL

## 2022-08-11 VITALS
SYSTOLIC BLOOD PRESSURE: 121 MMHG | HEART RATE: 89 BPM | WEIGHT: 258 LBS | BODY MASS INDEX: 40.49 KG/M2 | OXYGEN SATURATION: 97 % | DIASTOLIC BLOOD PRESSURE: 84 MMHG | HEIGHT: 67 IN

## 2022-08-11 DIAGNOSIS — F43.10 PTSD (POST-TRAUMATIC STRESS DISORDER): Primary | ICD-10-CM

## 2022-08-11 PROCEDURE — 3725F SCREEN DEPRESSION PERFORMED: CPT | Performed by: FAMILY MEDICINE

## 2022-08-11 PROCEDURE — 99214 OFFICE O/P EST MOD 30 MIN: CPT | Performed by: FAMILY MEDICINE

## 2022-08-11 NOTE — PROGRESS NOTES
Assessment/Plan:    No problem-specific Assessment & Plan notes found for this encounter  Diagnoses and all orders for this visit:    PTSD (post-traumatic stress disorder)          Subjective:   Chief Complaint   Patient presents with    Medication Management        Patient ID: Uzma Haas is a 25 y o  female  HPI    The following portions of the patient's history were reviewed and updated as appropriate: allergies, current medications, past family history, past medical history, past social history, past surgical history and problem list     Review of Systems   Constitutional: Negative for fatigue, fever and unexpected weight change  HENT: Negative for congestion, sinus pain and sore throat  Eyes: Negative for visual disturbance  Respiratory: Negative for shortness of breath and wheezing  Cardiovascular: Negative for chest pain and palpitations  Gastrointestinal: Negative for abdominal pain, nausea and vomiting  Musculoskeletal: Negative  Negative for arthralgias and myalgias  Neurological: Negative for syncope, weakness and numbness  Psychiatric/Behavioral: Negative  Negative for confusion, dysphoric mood and suicidal ideas  Objective:  Vitals:    08/11/22 1431   BP: 121/84   BP Location: Left arm   Patient Position: Sitting   Cuff Size: Adult   Pulse: 89   SpO2: 97%   Weight: 117 kg (258 lb)   Height: 5' 7" (1 702 m)      Physical Exam  Constitutional:       Appearance: She is well-developed  HENT:      Right Ear: Ear canal normal  Tympanic membrane is not injected  Left Ear: Ear canal normal  Tympanic membrane is not injected  Nose: Nose normal    Eyes:      General:         Right eye: No discharge  Left eye: No discharge  Conjunctiva/sclera: Conjunctivae normal       Pupils: Pupils are equal, round, and reactive to light  Neck:      Thyroid: No thyromegaly  Cardiovascular:      Rate and Rhythm: Normal rate and regular rhythm        Heart sounds: Normal heart sounds  No murmur heard  Pulmonary:      Effort: Pulmonary effort is normal  No respiratory distress  Breath sounds: Normal breath sounds  No wheezing  Abdominal:      General: Bowel sounds are normal  There is no distension  Palpations: Abdomen is soft  Tenderness: There is no abdominal tenderness  Musculoskeletal:         General: Normal range of motion  Cervical back: Normal range of motion and neck supple  Lymphadenopathy:      Cervical: No cervical adenopathy  Skin:     General: Skin is warm and dry  Neurological:      Mental Status: She is alert and oriented to person, place, and time  She is not disoriented  Sensory: No sensory deficit  Gait: Gait normal       Deep Tendon Reflexes: Reflexes are normal and symmetric  Psychiatric:         Speech: Speech normal          Behavior: Behavior normal          Thought Content:  Thought content normal          Judgment: Judgment normal

## 2023-01-09 ENCOUNTER — OFFICE VISIT (OUTPATIENT)
Dept: FAMILY MEDICINE CLINIC | Facility: CLINIC | Age: 23
End: 2023-01-09

## 2023-01-09 VITALS
BODY MASS INDEX: 41.28 KG/M2 | HEART RATE: 83 BPM | OXYGEN SATURATION: 99 % | DIASTOLIC BLOOD PRESSURE: 71 MMHG | SYSTOLIC BLOOD PRESSURE: 131 MMHG | HEIGHT: 67 IN | WEIGHT: 263 LBS | TEMPERATURE: 97.6 F

## 2023-01-09 DIAGNOSIS — Z77.21 EXPOSURE TO BODY FLUID: Primary | ICD-10-CM

## 2023-01-09 NOTE — PROGRESS NOTES
Assessment/Plan:    No problem-specific Assessment & Plan notes found for this encounter  Diagnoses and all orders for this visit:    Exposure to body fluid  -     Hepatitis C Ab W/Refl To HCV RNA, Qn, PCR; Future  -     HIV-1 RNA, quantitative, PCR; Future  -     Chlamydia/GC amplified DNA by PCR; Future  -     Hepatitis C Ab W/Refl To HCV RNA, Qn, PCR  -     HIV-1 RNA, quantitative, PCR  -     Chlamydia/GC amplified DNA by PCR          Subjective:   Chief Complaint   Patient presents with   • Earache     Right ear  STD test   • Physical Exam        Patient ID: Wellington Siddiqi is a 25 y o  female  HPI    The following portions of the patient's history were reviewed and updated as appropriate: allergies, current medications, past family history, past medical history, past social history, past surgical history and problem list     Review of Systems   Constitutional: Negative for fatigue, fever and unexpected weight change  HENT: Negative for congestion, sinus pain and sore throat  Eyes: Negative for visual disturbance  Respiratory: Negative for shortness of breath and wheezing  Cardiovascular: Negative for chest pain and palpitations  Gastrointestinal: Negative for abdominal pain, nausea and vomiting  Musculoskeletal: Negative  Negative for arthralgias and myalgias  Neurological: Negative for syncope, weakness and numbness  Psychiatric/Behavioral: Negative  Negative for confusion, dysphoric mood and suicidal ideas  Objective:  Vitals:    01/09/23 1456   BP: 131/71   BP Location: Left arm   Patient Position: Sitting   Cuff Size: Standard   Pulse: 83   Temp: 97 6 °F (36 4 °C)   TempSrc: Tympanic   SpO2: 99%   Weight: 119 kg (263 lb)   Height: 5' 7" (1 702 m)      Physical Exam  Constitutional:       Appearance: She is well-developed  HENT:      Right Ear: Ear canal normal  Tympanic membrane is not injected  Left Ear: Ear canal normal  Tympanic membrane is not injected  Nose: Nose normal    Eyes:      General:         Right eye: No discharge  Left eye: No discharge  Conjunctiva/sclera: Conjunctivae normal       Pupils: Pupils are equal, round, and reactive to light  Neck:      Thyroid: No thyromegaly  Cardiovascular:      Rate and Rhythm: Normal rate and regular rhythm  Heart sounds: Normal heart sounds  No murmur heard  Pulmonary:      Effort: Pulmonary effort is normal  No respiratory distress  Breath sounds: Normal breath sounds  No wheezing  Abdominal:      General: Bowel sounds are normal  There is no distension  Palpations: Abdomen is soft  Tenderness: There is no abdominal tenderness  Musculoskeletal:         General: Normal range of motion  Cervical back: Normal range of motion and neck supple  Lymphadenopathy:      Cervical: No cervical adenopathy  Skin:     General: Skin is warm and dry  Neurological:      Mental Status: She is alert and oriented to person, place, and time  She is not disoriented  Sensory: No sensory deficit  Gait: Gait normal       Deep Tendon Reflexes: Reflexes are normal and symmetric  Psychiatric:         Speech: Speech normal          Behavior: Behavior normal          Thought Content:  Thought content normal          Judgment: Judgment normal

## 2023-01-11 ENCOUNTER — TELEPHONE (OUTPATIENT)
Dept: FAMILY MEDICINE CLINIC | Facility: CLINIC | Age: 23
End: 2023-01-11

## 2023-01-11 LAB
C TRACH RRNA SPEC QL NAA+PROBE: NOT DETECTED
HCV AB S/CO SERPL IA: 0.03
HCV AB SERPL QL IA: NORMAL
HIV1 RNA # SERPL NAA+PROBE: NOT DETECTED COPIES/ML
HIV1 RNA SERPL NAA+PROBE-LOG#: NOT DETECTED LOG COPIES/ML
N GONORRHOEA RRNA SPEC QL NAA+PROBE: NOT DETECTED

## 2023-01-11 NOTE — TELEPHONE ENCOUNTER
----- Message from Jetty Duverney, MD sent at 1/11/2023  1:49 PM EST -----  All NL  ----- Message -----  From: [facility] Results In  Sent: 1/11/2023   1:30 PM EST  To: Jetty Duverney, MD

## 2023-01-23 ENCOUNTER — PROCEDURE VISIT (OUTPATIENT)
Dept: FAMILY MEDICINE CLINIC | Facility: CLINIC | Age: 23
End: 2023-01-23

## 2023-01-23 VITALS
OXYGEN SATURATION: 99 % | DIASTOLIC BLOOD PRESSURE: 72 MMHG | SYSTOLIC BLOOD PRESSURE: 119 MMHG | HEIGHT: 67 IN | HEART RATE: 82 BPM | BODY MASS INDEX: 40.97 KG/M2 | WEIGHT: 261 LBS | TEMPERATURE: 97.1 F

## 2023-01-23 DIAGNOSIS — L98.0 PYOGENIC GRANULOMA: Primary | ICD-10-CM

## 2023-01-23 NOTE — PROGRESS NOTES
Lesion Destruction    Date/Time: 1/23/2023 12:10 PM  Performed by: Celestino Sharma MD  Authorized by: Celestino Sharma MD   Universal Protocol:  Consent: Verbal consent obtained    Consent given by: patient  Patient understanding: patient states understanding of the procedure being performed      Procedure Details - Lesion Destruction:     Number of Lesions:  1  Lesion 1:     Body area:  Head/neck    Head/neck location:  R ear    Initial size (mm):  12    Final defect size (mm):  12    Malignancy: granulation tissue      Destruction method: surgical removal       Shave Bx pyogenic granuloma--2 cc pl xylo--no path sent

## 2023-02-13 ENCOUNTER — OFFICE VISIT (OUTPATIENT)
Dept: OBGYN CLINIC | Facility: CLINIC | Age: 23
End: 2023-02-13

## 2023-02-13 VITALS
WEIGHT: 268 LBS | SYSTOLIC BLOOD PRESSURE: 124 MMHG | BODY MASS INDEX: 42.06 KG/M2 | HEIGHT: 67 IN | DIASTOLIC BLOOD PRESSURE: 90 MMHG

## 2023-02-13 DIAGNOSIS — Z12.4 ENCOUNTER FOR PAPANICOLAOU SMEAR FOR CERVICAL CANCER SCREENING: ICD-10-CM

## 2023-02-13 DIAGNOSIS — Z01.419 ENCOUNTER FOR GYNECOLOGICAL EXAMINATION (GENERAL) (ROUTINE) WITHOUT ABNORMAL FINDINGS: Primary | ICD-10-CM

## 2023-02-13 DIAGNOSIS — Z30.015 ENCOUNTER FOR INITIAL PRESCRIPTION OF VAGINAL RING HORMONAL CONTRACEPTIVE: ICD-10-CM

## 2023-02-13 RX ORDER — CIPROFLOXACIN AND DEXAMETHASONE 3; 1 MG/ML; MG/ML
SUSPENSION/ DROPS AURICULAR (OTIC)
COMMUNITY
Start: 2023-01-16 | End: 2023-02-13 | Stop reason: ALTCHOICE

## 2023-02-13 RX ORDER — ETONOGESTREL AND ETHINYL ESTRADIOL 11.7; 2.7 MG/1; MG/1
INSERT, EXTENDED RELEASE VAGINAL
Qty: 1 EACH | Refills: 2 | Status: SHIPPED | OUTPATIENT
Start: 2023-02-13

## 2023-02-13 NOTE — PROGRESS NOTES
Assessment/Plan:  Pap every 3 years if normal Collected today  STI testing as indicated  Normal this month  Exercise most days of week-minimum of 150 minutes per week  Obtain appropriate diet and hydration  Calcium 1000mg (in divided doses-max 600 mg at one time) + 600 vit D daily  Birth control (nuva ring) ordered with instructions to do a home UPT today  If negative, no sex and repeat UPT in 2 weeks  If negative, insert nuva ring that day  Use a back up method x 7 days  Rx sent to pharmacy on file  Take as directed (ACHES reviewed)  Benefits, risks and alternatives discussed/reviewed  Condom use encouraged for prevention of sexually transmitted infections  Benefits, risks and alternatives of birth control discussed  She denies cigarette smoking, high blood pressure, a history of DVT, known thrombogenic mutations, migraines with aura, breast cancer, or liver disease  There are some drugs (such as certain anticonvulsants and antibiotics) that may decrease the contraceptive efficacy of OCPs, and she should call our office to confirm or use a backup method of contraception if taking these drugs  Expect irregular bleeding for the first 3 months  Return to office in 3 month (before birth control runs out) for follow up  HPV 9 vaccine series completed  Annual mammogram starting at age 37, monthly breast self exam recommended  Return to office in one year or sooner, if needed  1  Encounter for gynecological examination (general) (routine) without abnormal findings    2  Encounter for Papanicolaou smear for cervical cancer screening  -     Liquid-based pap, screening    3  Encounter for initial prescription of vaginal ring hormonal contraceptive  -     etonogestrel-ethinyl estradiol (NuvaRing) 0 12-0 015 MG/24HR vaginal ring; Insert vaginally and leave in place for 3 consecutive weeks, then remove for 1 week      4  BMI 40 0-44 9, adult (HCC)               Subjective:      Patient ID: Jeffrey Garcia is a 26 y o  female  HPI    Jeffrey Garcia is a 26 y o   female who is here today asa a new patient for her annual visit  No gynecologic health concerns  Irregular menses Q 3 months x 14 days with heavy flow x 8 then mod to light flow  Menses is not acceptable due to intermittent severe cramping at times  Menses was regular on her DANIELLE  Exercise- no as she just got over covid  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  [facility]  Jeffrey Garcia is sexually active with male partner of 3 years  She is non monogamous and feels safe in this relationship  She has other female partners and her male partner is aware  Denies bleeding but does admit to dryness which leads to insertional discomfort  She uses nothing  for contraception  Pregnnacy not desired  She is not interested in STD screening today  1/10/23 negative/normal GC/CT, HIV, Hepatitis C  She denies vaginal discharge, itching or pelvic pain  She has no  urinary concerns, does not have incontinence  No bowel concerns  No breast concerns  Last pap:   DEXA scan: N/A  Mammogram: N/A   Colonoscopy: N/A  HPV vaccine series:Completed  The following portions of the patient's history were reviewed and updated as appropriate: allergies, current medications, past family history, past medical history, past social history, past surgical history and problem list     Review of Systems   Constitutional: Negative  Negative for activity change, appetite change, chills, diaphoresis, fatigue, fever and unexpected weight change  HENT: Negative for congestion, dental problem, sneezing, sore throat and trouble swallowing  Eyes: Negative for visual disturbance  Respiratory: Negative for chest tightness and shortness of breath  Cardiovascular: Negative for chest pain and leg swelling  Gastrointestinal: Negative for abdominal pain, constipation, diarrhea, nausea and vomiting  Genitourinary: Positive for menstrual problem   Negative for difficulty urinating, dyspareunia, dysuria, frequency, hematuria, pelvic pain, urgency, vaginal bleeding, vaginal discharge and vaginal pain  Vaginal dryness   Musculoskeletal: Negative for back pain and neck pain  Skin: Negative  Allergic/Immunologic: Negative  Neurological: Negative for weakness and headaches  Hematological: Negative for adenopathy  Psychiatric/Behavioral: Negative  Objective:      /90 (BP Location: Left arm, Patient Position: Sitting, Cuff Size: Large)   Ht 5' 6 75" (1 695 m)   Wt 122 kg (268 lb)   LMP 11/14/2022 (Approximate)   BMI 42 29 kg/m²          Physical Exam  Vitals and nursing note reviewed  Constitutional:       Appearance: Normal appearance  She is well-developed  She is obese  HENT:      Head: Normocephalic and atraumatic  Eyes:      General:         Right eye: No discharge  Left eye: No discharge  Neck:      Thyroid: No thyromegaly  Trachea: Trachea normal    Cardiovascular:      Rate and Rhythm: Normal rate and regular rhythm  Heart sounds: Normal heart sounds  Pulmonary:      Effort: Pulmonary effort is normal       Breath sounds: Normal breath sounds  Chest:   Breasts:     Breasts are symmetrical       Right: Normal  No inverted nipple, mass, nipple discharge, skin change or tenderness  Left: Normal  No inverted nipple, mass, nipple discharge, skin change or tenderness  Abdominal:      Palpations: Abdomen is soft  Genitourinary:     General: Normal vulva  Exam position: Lithotomy position  Labia:         Right: No rash, tenderness, lesion or injury  Left: No rash, tenderness, lesion or injury  Urethra: No prolapse, urethral pain, urethral swelling or urethral lesion  Vagina: Normal  No signs of injury and foreign body  No vaginal discharge, erythema, tenderness or bleeding        Cervix: Normal       Uterus: Normal        Adnexa:         Right: No mass, tenderness or fullness  Left: No mass, tenderness or fullness  Rectum: No external hemorrhoid  Musculoskeletal:         General: Normal range of motion  Cervical back: Normal range of motion and neck supple  Lymphadenopathy:      Head:      Right side of head: No submental, submandibular or tonsillar adenopathy  Left side of head: No submental, submandibular or tonsillar adenopathy  Cervical: No cervical adenopathy  Upper Body:      Right upper body: No supraclavicular or axillary adenopathy  Left upper body: No supraclavicular or axillary adenopathy  Lower Body: No right inguinal adenopathy  No left inguinal adenopathy  Skin:     General: Skin is warm and dry  Neurological:      Mental Status: She is alert and oriented to person, place, and time     Psychiatric:         Mood and Affect: Mood normal          Behavior: Behavior normal

## 2023-02-13 NOTE — PATIENT INSTRUCTIONS
Pap every 3 years if normal Collected today  STI testing as indicated  Normal this month  Exercise most days of week-minimum of 150 minutes per week  Obtain appropriate diet and hydration  Calcium 1000mg (in divided doses-max 600 mg at one time) + 600 vit D daily  Birth control (nuva ring) ordered with instructions to do a home UPT today  If negative, no sex and repeat UPT in 2 weeks  If negative, insert nuva ring that day  Use a back up method x 7 days  Rx sent to pharmacy on file  Take as directed (ACHES reviewed)  Benefits, risks and alternatives discussed/reviewed  Condom use encouraged for prevention of sexually transmitted infections  Benefits, risks and alternatives of birth control discussed  She denies cigarette smoking, high blood pressure, a history of DVT, known thrombogenic mutations, migraines with aura, breast cancer, or liver disease  There are some drugs (such as certain anticonvulsants and antibiotics) that may decrease the contraceptive efficacy of OCPs, and she should call our office to confirm or use a backup method of contraception if taking these drugs  Expect irregular bleeding for the first 3 months  Return to office in 3 month (before birth control runs out) for follow up  HPV 9 vaccine series completed  Annual mammogram starting at age 37, monthly breast self exam recommended  Return to office in one year or sooner, if needed

## 2023-02-20 LAB
LAB AP GYN PRIMARY INTERPRETATION: NORMAL
Lab: NORMAL

## 2023-03-15 ENCOUNTER — APPOINTMENT (OUTPATIENT)
Dept: RADIOLOGY | Facility: CLINIC | Age: 23
End: 2023-03-15

## 2023-03-15 ENCOUNTER — APPOINTMENT (OUTPATIENT)
Dept: URGENT CARE | Facility: CLINIC | Age: 23
End: 2023-03-15

## 2023-03-15 DIAGNOSIS — S99.912A INJURY OF LEFT ANKLE, INITIAL ENCOUNTER: Primary | ICD-10-CM

## 2023-03-15 DIAGNOSIS — S99.912A INJURY OF LEFT ANKLE, INITIAL ENCOUNTER: ICD-10-CM

## 2023-03-18 ENCOUNTER — APPOINTMENT (OUTPATIENT)
Dept: URGENT CARE | Facility: CLINIC | Age: 23
End: 2023-03-18

## 2023-03-18 ENCOUNTER — OFFICE VISIT (OUTPATIENT)
Dept: URGENT CARE | Facility: CLINIC | Age: 23
End: 2023-03-18

## 2023-03-18 VITALS
DIASTOLIC BLOOD PRESSURE: 72 MMHG | HEART RATE: 90 BPM | TEMPERATURE: 98.2 F | OXYGEN SATURATION: 99 % | RESPIRATION RATE: 16 BRPM | SYSTOLIC BLOOD PRESSURE: 118 MMHG

## 2023-03-18 DIAGNOSIS — R30.0 DYSURIA: Primary | ICD-10-CM

## 2023-03-18 LAB
SL AMB  POCT GLUCOSE, UA: NEGATIVE
SL AMB LEUKOCYTE ESTERASE,UA: ABNORMAL
SL AMB POCT BILIRUBIN,UA: NEGATIVE
SL AMB POCT BLOOD,UA: ABNORMAL
SL AMB POCT CLARITY,UA: CLEAR
SL AMB POCT COLOR,UA: ABNORMAL
SL AMB POCT KETONES,UA: NEGATIVE
SL AMB POCT NITRITE,UA: NEGATIVE
SL AMB POCT PH,UA: 6.5
SL AMB POCT SPECIFIC GRAVITY,UA: 1.02
SL AMB POCT URINE PROTEIN: ABNORMAL
SL AMB POCT UROBILINOGEN: 0.2

## 2023-03-18 RX ORDER — SULFAMETHOXAZOLE AND TRIMETHOPRIM 800; 160 MG/1; MG/1
1 TABLET ORAL EVERY 12 HOURS SCHEDULED
Qty: 6 TABLET | Refills: 0 | Status: SHIPPED | OUTPATIENT
Start: 2023-03-18 | End: 2023-03-21

## 2023-03-18 NOTE — PATIENT INSTRUCTIONS
Ankle Exercises   WHAT YOU NEED TO KNOW:   What do I need to know about ankle exercises? Ankle exercises help strengthen your ankle and improve its function after injury  These are beginning exercises  Ask your healthcare provider if you need to see a physical therapist for more advanced exercises  What are some general guidelines for ankle exercises? Do these exercises 3 to 5 days a week, or as directed by your healthcare provider  Ask if you should do the exercises on each ankle  Do the exercises in the order that your healthcare provider recommends  This will help prevent swelling, chronic pain, and reinjury  Start with range of motion exercises  Then move to strengthening exercises, and finally to balancing exercises  Warm up before you do ankle exercises  Walk or ride a stationary bike for 5 to 10 minutes to prepare your ankle for movement  Stop if you feel pain  It is normal to feel some discomfort at first but you should not feel pain  Tell your doctor or physical therapist if you have pain while you exercise  Regular exercise will help decrease your discomfort over time  How do I perform range of motion exercises safely? Begin with range of motion exercises to improve flexibility  Ask your healthcare provider when you can progress to strengthening exercises  Ankle alphabet:  Sit on a chair so that your feet do not touch the floor  Use your big toe to write each letter of the alphabet  Use only your foot and ankle, and keep your movements small  Do 2 sets  Calf stretches:      Sitting calf stretches with a towel:  Sit on the floor with both legs out straight in front of you  Loop a towel around the ball of your injured foot  Grasp the ends of the towel and pull it toward you  Keep your leg and back straight  Do not lean forward as you pull the towel  Hold for 30 seconds  Then relax for 30 seconds  Do 2 sets of 10           Standing calf stretches:  Stand facing a wall with the foot that is not injured forward and your knee slightly bent  Keep the leg with the injured foot straight and behind you with your toes pointed in slightly  With both heels flat on the floor, press your hips forward  Do not arch your back  Hold for 30 seconds, and then relax for 30 seconds  Do 2 sets of 10  Repeat with your leg bent  Do 2 sets of 10  How do I perform strengthening exercises safely? After you can perform range of motion exercises without pain, you may begin strengthening exercises  Ask your healthcare provider when you can progress to balancing exercises  Ankle movement in 4 directions:  Sit on the floor with your legs straight in front of you  Keep your heels on the floor for support  Dorsiflexion:  Begin with your toes pointing straight up  Pull your toes toward your body  Slowly return to the starting position  Do 3 sets of 5  Plantar flexion:  Begin with your toes pointing straight up  Push your toes away from your body  Slowly return to the starting position  Do 3 sets of 5  Inversion:  Begin with your toes pointing straight up  Push your toes inward, toward each other  Slowly return to the starting position  Do 3 sets of 5  Eversion:  Begin with your toes pointing straight up  Push your toes outward, away from each other  Slowly return to the starting position  Do 3 sets of 5  Toe curls with a towel:  Sit on a chair so that both of your feet are flat on the floor  Place a small towel on the floor in front of your injured foot  Grab the center of the towel with your toes and curl the towel toward you  Relax and repeat  Do 1 set of 5  [place] pick-ups:  Sit on a chair so that both of your feet are flat on the floor  Place 20 marbles on the floor in front of your injured foot  Use your toes to  one marble at a time and place it into a bowl  Repeat until you have picked up all the marbles  Do 1 set       Heel raises:      Single leg heel raises:  Stand with your weight evenly on both feet  Hold on to a chair or a wall for balance  Lift the foot that is not injured off the floor so all your weight is placed on your injured foot  Raise the heel of your injured foot as high as you can  Slowly lower your heel to the floor  Do 1 set of 10  Double leg heel raises:  Stand with your weight evenly on both feet  Hold on to a chair or a wall for balance  Raise both of your heels as high as you can  Slowly lower your heels to the floor  Do 1 set of 10  Heel and toe walks:      Heel walks:  Begin in a standing position  Lift your toes off the floor and walk on your heels  Keep your toes lifted as high as possible  Do 2 sets of 10  Toe walks:  Begin in a standing position  Lift your heels off the floor and walk on the balls and toes of your feet  Keep your heels lifted as high as possible  Do 2 sets of 10  How do I perform a balance exercise safely? After you can perform strengthening exercises without pain, you may do this beginning balancing exercise  Ask your healthcare provider for more advanced balance exercises  Single leg stance:  Stand with your weight evenly on both feet, or hold on to a chair or a wall  Do not lean to the side  Lift the foot that is not injured off the floor so all your weight is placed on your injured foot  Balance on your injured foot  Ask your healthcare provider how long to hold this position  When should I call my doctor or physical therapist?   You have new pain, or your pain becomes worse  You have questions or concerns about your condition, care, or exercise program     CARE AGREEMENT:   You have the right to help plan your care  Learn about your health condition and how it may be treated  Discuss treatment options with your healthcare providers to decide what care you want to receive  You always have the right to refuse treatment  The above information is an  only   It is not intended as medical advice for individual conditions or treatments  Talk to your doctor, nurse or pharmacist before following any medical regimen to see if it is safe and effective for you  © Copyright [employer]er 2022 Information is for End User's use only and may not be sold, redistributed or otherwise used for commercial purposes  Urinary Tract Infection in Women   AMBULATORY CARE:   A urinary tract infection (UTI)  is caused by bacteria that get inside your urinary tract  Your urinary tract includes your kidneys, ureters, bladder, and urethra  A UTI is more common in your lower urinary tract, which includes your bladder and urethra  Common symptoms include the following:   Urinating more often or waking from sleep to urinate    Pain or burning when you urinate    Pain or pressure in your lower abdomen and back    Urine that smells bad    Blood in your urine    Leaking urine    Seek care immediately if:   You are urinating very little or not at all  You have a high fever with shaking chills  You have side or back pain that gets worse  Call your doctor if:   You have a fever  You do not feel better after 2 days of taking antibiotics  You have new symptoms, such as blood or pus in your urine  You are vomiting  You have questions or concerns about your condition or care  Treatment for a UTI  may include antibiotics to treat a bacterial infection  You may also need medicines to decrease pain and burning, or decrease the urge to urinate often  If you have UTIs often (called recurrent UTIs), you may be given antibiotics to take regularly  You will be given directions for when and how to use antibiotics  The goal is to prevent UTIs but not cause antibiotic resistance by using antibiotics too often  Prevent a UTI:   Empty your bladder often  Urinate and empty your bladder as soon as you feel the need  Do not hold your urine for long periods of time      Wipe from front to back after you urinate or have a bowel movement  This will help prevent germs from getting into your urinary tract through your urethra  Drink liquids as directed  Ask how much liquid to drink each day and which liquids are best for you  You may need to drink more liquids than usual to help flush out the bacteria  Do not drink alcohol, caffeine, or citrus juices  These can irritate your bladder and increase your symptoms  Your healthcare provider may recommend cranberry juice to help prevent a UTI  Urinate before and after you have sex  This can help flush out bacteria passed during sex  Do not douche or use feminine deodorants  These can change the chemical balance in your vagina  Change sanitary pads or tampons often  This will help prevent germs from getting into your urinary tract  Talk to your healthcare provider about your birth control method  You may need to change your method if it is increasing your risk for UTIs  Wear cotton underwear and clothes that are loose  Tight pants and nylon underwear can trap moisture and cause bacteria to grow  Vaginal estrogen may be recommended  This medicine helps prevent UTIs in women who have gone through menopause or are in festus-menopause  Do pelvic muscle exercises often  Pelvic muscle exercises may help you start and stop urinating  Strong pelvic muscles may help you empty your bladder easier  Squeeze these muscles tightly for 5 seconds like you are trying to hold back urine  Then relax for 5 seconds  Gradually work up to squeezing for 10 seconds  Do 3 sets of 15 repetitions a day, or as directed  Follow up with your doctor as directed:  Write down your questions so you remember to ask them during your visits  © Copyright [facility] 2022 Information is for End User's use only and may not be sold, redistributed or otherwise used for commercial purposes  The above information is an  only   It is not intended as medical advice for individual conditions or treatments  Talk to your doctor, nurse or pharmacist before following any medical regimen to see if it is safe and effective for you

## 2023-03-18 NOTE — PROGRESS NOTES
[facility]        NAME: Charmaine Rice is a 26 y o  female  : 2000    MRN: [id number]  DATE: 2023  TIME: 10:09 AM    Assessment and Plan   Dysuria [R30 0]  1  Dysuria  POCT urine dip    sulfamethoxazole-trimethoprim (BACTRIM DS) 800-160 mg per tablet      ua positive leuks and blood   Will start course of bactrim and will send urine C&S   F/u with pcp  Pt in agreement with plan of care    Patient Instructions     Follow up with PCP in 3-5 days  Proceed to  ER if symptoms worsen  Chief Complaint     Chief Complaint   Patient presents with   • Urinary Frequency     Urinary tract infection symptoms started during menstrual cycle and continued after menstrual cycle ended  Burning sensation upon urination, lower abdominal pain reported as well  History of Present Illness   Charmaine Rice presents to the clinic c/o    Urinary Frequency (Urinary tract infection symptoms started during menstrual cycle and continued after menstrual cycle ended  Burning sensation upon urination, lower abdominal pain reported as well  )  Denies any h/o utis, however does have a h/o yeast infections -   Denies any abnormal vaginal discharge  H/o pcos - this feels different than her normal lower abd discomfort with that  Symptoms for the past 1 1/2 weeks - not worsening, but not improving either   Currently sexually active and does urinate after relations      Review of Systems   Review of Systems   All other systems reviewed and are negative  Current Medications     Long-Term Medications   Medication Sig Dispense Refill   • etonogestrel-ethinyl estradiol (NuvaRing) 0 12-0 015 MG/24HR vaginal ring Insert vaginally and leave in place for 3 consecutive weeks, then remove for 1 week   (Patient not taking: Reported on 3/18/2023) 1 each 2       Current Allergies     Allergies as of 2023 - Reviewed 2023   Allergen Reaction Noted   • Azithromycin Abdominal Pain 2017            The following portions of the patient's history were reviewed and updated as appropriate: allergies, current medications, past family history, past medical history, past social history, past surgical history and problem list     Objective   /72 (BP Location: Left arm, Patient Position: Sitting, Cuff Size: Large)   LMP 02/26/2023 (Approximate) Comment: LMP started 2/26/23 and lasted two weeks  Pt has hx of PCOS       Physical Exam     Physical Exam  Vitals and nursing note reviewed  Constitutional:       Appearance: Normal appearance  She is well-developed  HENT:      Head: Normocephalic and atraumatic  Eyes:      General: Lids are normal       Conjunctiva/sclera: Conjunctivae normal    Cardiovascular:      Rate and Rhythm: Normal rate and regular rhythm  Heart sounds: Normal heart sounds, S1 normal and S2 normal    Pulmonary:      Effort: Pulmonary effort is normal       Breath sounds: Normal breath sounds  Abdominal:      Tenderness: There is abdominal tenderness in the suprapubic area  There is no right CVA tenderness or left CVA tenderness  Skin:     General: Skin is warm and dry  Neurological:      Mental Status: She is alert and oriented to person, place, and time  Psychiatric:         Speech: Speech normal          Behavior: Behavior normal  Behavior is cooperative  Thought Content:  Thought content normal          Judgment: Judgment normal

## 2023-03-19 LAB — BACTERIA UR CULT: NORMAL

## 2023-03-20 LAB — BACTERIA UR CULT: NORMAL

## 2023-03-31 ENCOUNTER — OFFICE VISIT (OUTPATIENT)
Dept: OBGYN CLINIC | Facility: CLINIC | Age: 23
End: 2023-03-31

## 2023-03-31 VITALS
HEIGHT: 67 IN | BODY MASS INDEX: 40.81 KG/M2 | WEIGHT: 260 LBS | SYSTOLIC BLOOD PRESSURE: 133 MMHG | DIASTOLIC BLOOD PRESSURE: 85 MMHG | HEART RATE: 85 BPM

## 2023-03-31 DIAGNOSIS — E66.01 OBESITY, CLASS III, BMI 40-49.9 (MORBID OBESITY) (HCC): ICD-10-CM

## 2023-03-31 DIAGNOSIS — N93.9 ABNORMAL UTERINE BLEEDING (AUB): ICD-10-CM

## 2023-03-31 DIAGNOSIS — E28.2 PCOS (POLYCYSTIC OVARIAN SYNDROME): Primary | ICD-10-CM

## 2023-03-31 RX ORDER — DROSPIRENONE AND ETHINYL ESTRADIOL 0.02-3(28)
1 KIT ORAL DAILY
Qty: 28 TABLET | Refills: 3 | Status: SHIPPED | OUTPATIENT
Start: 2023-03-31

## 2023-03-31 NOTE — PROGRESS NOTES
PROBLEM GYNECOLOGICAL VISIT    Elmer Espinoza is a 26 y o  female who presents today with complaint of AUB  Her general medical history has been reviewed and she reports it as follows:    Past Medical History:   Diagnosis Date   • Bipolar 1 disorder ([address] )    • Chlamydia    • PCOS (polycystic ovarian syndrome) 2022   • Psychiatric disorder     bipolar    • Vertigo      History reviewed  No pertinent surgical history  OB History        0    Para   0    Term   0       0    AB   0    Living   0       SAB   0    IAB   0    Ectopic   0    Multiple   0    Live Births   0               Social History     Tobacco Use   • Smoking status: Never   • Smokeless tobacco: Never   Vaping Use   • Vaping Use: Former   • Quit date: 8/10/2022   Substance Use Topics   • Alcohol use: Yes     Alcohol/week: 5 0 standard drinks     Types: 5 Standard drinks or equivalent per week     Comment: 1 - 2 drinks monthly   • Drug use: Yes     Types: Marijuana     Comment: Medical MJ     Social History     Substance and Sexual Activity   Sexual Activity Yes   • Partners: Female, Male   • Birth control/protection: None       Current Outpatient Medications   Medication Instructions   • drospirenone-ethinyl estradiol (KASH) 3-0 02 MG per tablet 1 tablet, Oral, Daily       History of Present Illness:   Brenda Roldan presents today to discuss AUB  She has had a life long history of irregular menses and was diagnosed with PCOS a few years ago  Her menses typically happen every few months, last cycle went 6 months without menses and then had a prolonged, heavy menses from the end of February up until a few days ago (bleeding for almost one month)  She had significant pelvic pain for the entire month with this menses, which is new for her  She does feel that as she has gained weight this has made her cycles more abnormal      She was prescribed Nuvaring last month at another GYN office but did not start this   She feels uncomfortable with the "insertion of the ring  She had planned to use this as both a way to manage menses and for contraception  She is sexually active with one male partner in a long term, monogamous relationship  They are not ready for pregnancy at this time  Earlier this week she had a normal TSH and a CBC showing no anemia, results at an outside facility  Review of Systems:  Review of Systems   Constitutional: Negative  Gastrointestinal: Negative  Genitourinary: Positive for menstrual problem  Physical Exam:  /85 (BP Location: Right arm)   Pulse 85   Ht 5' 6 75\" (1 695 m)   Wt 118 kg (260 lb)   LMP 02/26/2023   BMI 41 03 kg/m²   Physical Exam  Constitutional:       General: She is not in acute distress  Appearance: Normal appearance  Neurological:      Mental Status: She is alert  Skin:     General: Skin is warm and dry  Psychiatric:         Mood and Affect: Mood normal          Behavior: Behavior normal    Vitals reviewed  Assessment:   1  PCOS   2  AUB   3  Obesity     Plan:   1  Discussed PCOS management including both medical and lifestyle factors  Encouraged healthy diet, active lifestyle and stress management  Discussed hormonal contraception for management of PCOS with additional benefit of preventing pregnancy  2  She would like to proceed with OCP use  She had used Junel in the past but had issues with mood on this pill  Will trial Ne now, discussed that drosperinone has the additional benefit of improving hirsutism and ance which can be beneficial for women with PCOS  Reviewed instructions for use, risks/benefits and expected bleeding patterns  3  Pelvic US ordered to assess for structural causes of dysmenorrhea with past cycle  4  [facility]erral [facility] for [facility]  Patient has expressed desire to become a more healthy weight both for her health and for the improvement of PCOS symptoms   Orders placed for A1C     5  Will plan for follow up and OCP check up in " three months     Reviewed with patient that test results are available in MyChart immediately, but that they will not necessarily be reviewed by me immediately  Explained that I will review results at my earliest opportunity and contact patient appropriately

## 2023-04-22 DIAGNOSIS — E28.2 PCOS (POLYCYSTIC OVARIAN SYNDROME): ICD-10-CM

## 2023-05-15 ENCOUNTER — OFFICE VISIT (OUTPATIENT)
Dept: OBGYN CLINIC | Facility: CLINIC | Age: 23
End: 2023-05-15

## 2023-05-15 VITALS
HEART RATE: 109 BPM | HEIGHT: 67 IN | SYSTOLIC BLOOD PRESSURE: 135 MMHG | DIASTOLIC BLOOD PRESSURE: 86 MMHG | WEIGHT: 259 LBS | BODY MASS INDEX: 40.65 KG/M2

## 2023-05-15 DIAGNOSIS — Z30.41 SURVEILLANCE OF CONTRACEPTIVE PILL: Primary | ICD-10-CM

## 2023-05-15 DIAGNOSIS — E28.2 PCOS (POLYCYSTIC OVARIAN SYNDROME): ICD-10-CM

## 2023-05-15 RX ORDER — DROSPIRENONE AND ETHINYL ESTRADIOL 0.02-3(28)
1 KIT ORAL DAILY
Qty: 84 TABLET | Refills: 3 | Status: SHIPPED | OUTPATIENT
Start: 2023-05-15

## 2023-05-15 NOTE — PROGRESS NOTES
PROBLEM GYNECOLOGICAL VISIT    Darrius Rosales is a 26 y o  female who presents today for surveillance of contraceptive pill  Her general medical history has been reviewed and she reports it as follows:    Past Medical History:   Diagnosis Date   • Bipolar 1 disorder ([address] )    • Chlamydia    • PCOS (polycystic ovarian syndrome) 2022   • Psychiatric disorder     bipolar    • Vertigo      History reviewed  No pertinent surgical history  OB History        0    Para   0    Term   0       0    AB   0    Living   0       SAB   0    IAB   0    Ectopic   0    Multiple   0    Live Births   0               Social History     Tobacco Use   • Smoking status: Never   • Smokeless tobacco: Never   Vaping Use   • Vaping Use: Former   • Quit date: 8/10/2022   Substance Use Topics   • Alcohol use: Yes     Alcohol/week: 5 0 standard drinks     Types: 5 Standard drinks or equivalent per week     Comment: 1 - 2 drinks monthly   • Drug use: Yes     Types: Marijuana     Comment: Medical MJ     Social History     Substance and Sexual Activity   Sexual Activity Yes   • Partners: Female, Male   • Birth control/protection: None       Current Outpatient Medications   Medication Instructions   • Vestura 3-0 02 MG per tablet TAKE 1 TABLET BY MOUTH EVERY DAY       History of Present Illness:   Paige Woo presents today for surveillance of contraception pills  She was started on Ne about two months ago both for the management of PCOS symptoms and for contraception purposes  She is overall feeling very well on this OCP  She had previosly felt that she had been steadily gaining weight every week, but since starting Ne has lost 0 5 pounds  She did have some significant cramping on Friday during the first day of her menses  She then passed a quarter size clot and cramping improved  She was concerned for possible miscarriage due to the size of the clot but she had a negative home UPT that day       Review of Systems:  Review of Systems "  Constitutional: Negative  Gastrointestinal: Negative  Genitourinary: Negative  Physical Exam:  /86 (BP Location: Left arm, Patient Position: Sitting, Cuff Size: Large)   Pulse (!) 109   Ht 5' 6 75\" (1 695 m)   Wt 117 kg (259 lb)   LMP 05/12/2023 (Exact Date)   BMI 40 87 kg/m²   Physical Exam  Constitutional:       General: She is not in acute distress  Appearance: Normal appearance  Neurological:      Mental Status: She is alert  Skin:     General: Skin is warm and dry  Psychiatric:         Mood and Affect: Mood normal          Behavior: Behavior normal    Vitals reviewed  Assessment:   1  Surveillance of contraceptive pill    Plan:   1  Patient brought in a photo of the clot which we reviewed together  Reassurance given that due to negative upt I have no concern for miscarriage  2  She is overall doing well on Ne  Finally feels that weight gain has stabilized on Ne  Refills approved  3  Planning to see [facility] in July  4  Will plan to return next year for annual exam or sooner if needed  Reviewed with patient that [facility], but that they will not necessarily be reviewed by me immediately  Explained that I will review results at my earliest opportunity and contact patient appropriately    "

## 2023-05-22 ENCOUNTER — OFFICE VISIT (OUTPATIENT)
Dept: FAMILY MEDICINE CLINIC | Facility: CLINIC | Age: 23
End: 2023-05-22

## 2023-05-22 VITALS
WEIGHT: 259 LBS | BODY MASS INDEX: 40.65 KG/M2 | OXYGEN SATURATION: 99 % | DIASTOLIC BLOOD PRESSURE: 80 MMHG | TEMPERATURE: 97.3 F | HEART RATE: 74 BPM | SYSTOLIC BLOOD PRESSURE: 120 MMHG | HEIGHT: 67 IN

## 2023-05-22 DIAGNOSIS — E34.8 PINEAL GLAND CYST: Primary | ICD-10-CM

## 2023-05-22 NOTE — PROGRESS NOTES
"Assessment/Plan:    Pineal gland cyst  incr Has---needs MRI to assess pineal gl cyst for stability       Diagnoses and all orders for this visit:    Pineal gland cyst          Subjective:   Chief Complaint   Patient presents with   • referrall        Patient ID: Sharyle Coup is a 26 y o  female  HPI    The following portions of the patient's history were reviewed and updated as appropriate: allergies, current medications, past family history, past medical history, past social history, past surgical history and problem list     Review of Systems   Constitutional: Negative for fatigue, fever and unexpected weight change  HENT: Negative for congestion, sinus pain and sore throat  Eyes: Negative for visual disturbance  Respiratory: Negative for shortness of breath and wheezing  Cardiovascular: Negative for chest pain and palpitations  Gastrointestinal: Negative for abdominal pain, nausea and vomiting  Musculoskeletal: Negative  Negative for arthralgias and myalgias  Neurological: Negative for syncope, weakness and numbness  Psychiatric/Behavioral: Negative  Negative for confusion, dysphoric mood and suicidal ideas  Objective:  Vitals:    05/22/23 0857   BP: 120/80   BP Location: Left arm   Patient Position: Sitting   Cuff Size: Standard   Pulse: 74   Temp: (!) 97 3 °F (36 3 °C)   TempSrc: Tympanic   SpO2: 99%   Weight: 117 kg (259 lb)   Height: 5' 6 75\" (1 695 m)      Physical Exam  Constitutional:       Appearance: She is well-developed  HENT:      Right Ear: Ear canal normal  Tympanic membrane is not injected  Left Ear: Ear canal normal  Tympanic membrane is not injected  Nose: Nose normal    Eyes:      General:         Right eye: No discharge  Left eye: No discharge  Conjunctiva/sclera: Conjunctivae normal       Pupils: Pupils are equal, round, and reactive to light  Neck:      Thyroid: No thyromegaly     Cardiovascular:      Rate and Rhythm: Normal rate and " regular rhythm  Heart sounds: Normal heart sounds  No murmur heard  Pulmonary:      Effort: Pulmonary effort is normal  No respiratory distress  Breath sounds: Normal breath sounds  No wheezing  Abdominal:      General: Bowel sounds are normal  There is no distension  Palpations: Abdomen is soft  Tenderness: There is no abdominal tenderness  Musculoskeletal:         General: Normal range of motion  Cervical back: Normal range of motion and neck supple  Lymphadenopathy:      Cervical: No cervical adenopathy  Skin:     General: Skin is warm and dry  Neurological:      Mental Status: She is alert and oriented to person, place, and time  She is not disoriented  Sensory: No sensory deficit  Gait: Gait normal       Deep Tendon Reflexes: Reflexes are normal and symmetric  Psychiatric:         Speech: Speech normal          Behavior: Behavior normal          Thought Content:  Thought content normal          Judgment: Judgment normal

## 2023-06-12 ENCOUNTER — HOSPITAL ENCOUNTER (OUTPATIENT)
Dept: MRI IMAGING | Facility: HOSPITAL | Age: 23
Discharge: HOME | End: 2023-06-12
Payer: COMMERCIAL

## 2023-06-12 DIAGNOSIS — E34.8 PINEAL GLAND CYST: ICD-10-CM

## 2023-06-12 PROCEDURE — A9585 GADOBUTROL INJECTION: HCPCS | Performed by: FAMILY MEDICINE

## 2023-06-12 PROCEDURE — G1004 CDSM NDSC: HCPCS

## 2023-06-12 PROCEDURE — 70553 MRI BRAIN STEM W/O & W/DYE: CPT

## 2023-06-12 RX ADMIN — GADOBUTROL 12 ML: 604.72 INJECTION INTRAVENOUS at 16:27

## 2023-06-21 ENCOUNTER — TELEPHONE (OUTPATIENT)
Dept: FAMILY MEDICINE CLINIC | Facility: CLINIC | Age: 23
End: 2023-06-21

## 2023-07-24 ENCOUNTER — CONSULT (OUTPATIENT)
Dept: BARIATRICS | Facility: CLINIC | Age: 23
End: 2023-07-24
Payer: COMMERCIAL

## 2023-07-24 VITALS
HEIGHT: 66 IN | WEIGHT: 258.6 LBS | HEART RATE: 80 BPM | DIASTOLIC BLOOD PRESSURE: 80 MMHG | SYSTOLIC BLOOD PRESSURE: 125 MMHG | RESPIRATION RATE: 16 BRPM | BODY MASS INDEX: 41.56 KG/M2

## 2023-07-24 DIAGNOSIS — E53.8 VITAMIN B12 DEFICIENCY: Primary | ICD-10-CM

## 2023-07-24 DIAGNOSIS — E66.01 OBESITY, CLASS III, BMI 40-49.9 (MORBID OBESITY) (HCC): ICD-10-CM

## 2023-07-24 DIAGNOSIS — E28.2 PCOS (POLYCYSTIC OVARIAN SYNDROME): ICD-10-CM

## 2023-07-24 PROCEDURE — 99204 OFFICE O/P NEW MOD 45 MIN: CPT | Performed by: PHYSICIAN ASSISTANT

## 2023-07-24 NOTE — PROGRESS NOTES
Assessment/Plan:    Obesity, Class III, BMI 40-49.9 (morbid obesity) ([address])  -Discussed options of HealthyCORE-Intensive Lifestyle Intervention Program, Very Low Calorie Diet-VLCD, Conservative Program, Chris-En-Y Gastric Bypass and Vertical Sleeve Gastrectomy and the role of weight loss medications. Explained the importance of making lifestyle changes first before starting anti-obesity medications.  -Initial weight loss goal of 5-10% weight loss for improved health  -NEEDS LABS-recheck b12, was low in 2021  -Labs reviewed from prior and all within acceptable limits  - STOP BANG-3/8    -Patient is interested in pursuing healthycore    Goals:  -discussed starting to balance calories throughout the day. Recommend having a light breakfast to start with a protein in it.   -discussed cutting back on alcohol intake  -recommend adding on exercise outside of work with 1.5 hour goal    Discussed medication options and she was interested in starting on wegovy. Discussed supply issue Patient denies personal and family history of MCT and MEN2 tumors. Patient denies personal history of pancreatitis. Side effects discussed but not limited to diarrhea, bloating, constipation, GI upset, heartburn, increased heart rate, headache, low blood sugar, fatigue and dizziness. Titration and medication administration discussed. PCOS (polycystic ovarian syndrome)  Was on metformin prior and had side effects. Not currently on OCP. Would benefit from GLP-1RA      Follow up in approximately 2 month nurse visit, 4 month  with Non-Surgical Physician/Advanced Practitioner and to start healthycore in 3 weeks. I have spent a total time of 50 minutes on 07/24/23 in caring for this patient including Diagnostic results, Prognosis, Risks and benefits of tx options, Instructions for management, Patient and family education, Importance of tx compliance, Risk factor reductions, Impressions and Documenting in the medical record.         Diagnoses and all orders for this visit:    Vitamin B12 deficiency  -     Vitamin B12; Future    PCOS (polycystic ovarian syndrome)  -     Ambulatory Referral to Weight Management  -     Semaglutide-Weight Management (WEGOVY) 0.25 MG/0.5ML; Inject 0.5 mL (0.25 mg total) under the skin once a week    Obesity, Class III, BMI 40-49.9 (morbid obesity) ([address] [facility])  -     Ambulatory Referral to Weight Management  -     Semaglutide-Weight Management (WEGOVY) 0.25 MG/0.5ML; Inject 0.5 mL (0.25 mg total) under the skin once a week          Subjective:   Chief Complaint   Patient presents with   • Consult     MWM Consult; Gw-160lb; waist-40in ; Stop bang-2/8       Patient ID: Wyatt Figueroa  is a 26 y.o. female with excess weight/obesity here to pursue weight management. Past Medical History:   Diagnosis Date   • Bipolar 1 disorder ([address] [facility])    • Chlamydia    • PCOS (polycystic ovarian syndrome) 2/9/2022   • Psychiatric disorder     bipolar    • Vertigo        HPI: Here for MWM consult  She started to gain weight after graduating from [address] [place] [address]. 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 Also started to gain more over the pandemic and with starting a new relationship. She is no longer on OCP due to side effects. She does have a hx of PCOS. She does have irregular menses.   She was on metformin prior but it gave her upset stomach    Obesity/Excess Weight:  Severity: class III  Onset:  Last 3 years    Modifiers: Diet and Exercise  Contributing factors: Poor Food Choices and boredom eating  Associated symptoms: comorbid conditions  Usually eats more at night when working and when not working eats better     Goals:  Hydration:water more than 60 oz, rare ice tea  Alcohol: weekends 5 shots   Exercise:Sometimes at work   Occupation:works in [employer]  Sleep:  [address] out/takeout:more than 4 times a week    Colonoscopy-Not applicable    Diet Recall  S(around 3PM): pizza from work   D:varies   S: chips, ice cream     The following portions of the patient's history were reviewed and updated as appropriate:   She  has a past medical history of Bipolar 1 disorder ([address]), Chlamydia, PCOS (polycystic ovarian syndrome) (2/9/2022), Psychiatric disorder, and Vertigo. She   Patient Active Problem List    Diagnosis Date Noted   • Obesity, Class III, BMI 40-49.9 (morbid obesity) ([address]) 07/24/2023   • Grade 3 ankle sprain 06/08/2022   • Sprain of right ankle 06/03/2022   • PCOS (polycystic ovarian syndrome) 02/09/2022   • Pineal gland cyst 08/27/2021   • PTSD (post-traumatic stress disorder) 08/27/2021   • Vertigo    • Bipolar 1 disorder ([address])    • Lower abdominal pain 04/03/2019   • Diarrhea 04/03/2019     She  has no past surgical history on file. Her family history includes Anxiety disorder in her family; Brain cancer in her maternal grandfather; Breast cancer in her family; Breast cancer (age of onset: 61) in her maternal grandmother and paternal grandmother; Diabetes in her family; Hypertension in her family; Lung cancer in her paternal grandfather; Other in her family. She  reports that she has never smoked. She has never used smokeless tobacco. She reports current alcohol use of about 5.0 standard drinks of alcohol per week. She reports current drug use. Drug: Marijuana.   Current Outpatient Medications   Medication Sig Dispense Refill   • Semaglutide-Weight Management (WEGOVY) 0.25 MG/0.5ML Inject 0.5 mL (0.25 mg total) under the skin once a week 2 mL 0   • drospirenone-ethinyl estradiol (Vestura) 3-0.02 MG per tablet Take 1 tablet by mouth daily 84 tablet 3     Current Facility-Administered Medications   Medication Dose Route Frequency Provider Last Rate Last Admin   • cyanocobalamin injection 1,000 mcg  1,000 mcg Intramuscular Q30 Days KATINA Goldstein   1,000 mcg at 08/25/20 1517     Current Outpatient Medications on File Prior to Visit   Medication Sig   • drospirenone-ethinyl estradiol (Vestura) 3-0.02 MG per tablet Take 1 tablet by mouth daily Current Facility-Administered Medications on File Prior to Visit   Medication   • cyanocobalamin injection 1,000 mcg     She is allergic to azithromycin. .    Review of Systems   Constitutional: Negative for fever. Respiratory: Negative for shortness of breath. Cardiovascular: Negative for chest pain and palpitations. Gastrointestinal: Negative for abdominal pain, constipation, diarrhea and vomiting. +GERD-intermittent   Genitourinary: Positive for menstrual problem (irregular). Negative for difficulty urinating. Musculoskeletal: Negative for arthralgias and back pain. Skin: Negative for rash. Neurological: Negative for headaches. Psychiatric/Behavioral: Negative for dysphoric mood. The patient is not nervous/anxious. Objective:    /80   Pulse 80   Resp 16   Ht 5' 6" (1.676 m)   Wt 117 kg (258 lb 9.6 oz)   BMI 41.74 kg/m²     Physical Exam  Vitals and nursing note reviewed. Constitutional:       General: She is not in acute distress. Appearance: She is well-developed. She is obese. HENT:      Head: Normocephalic and atraumatic. Eyes:      Conjunctiva/sclera: Conjunctivae normal.   Neck:      Thyroid: No thyromegaly. Pulmonary:      Effort: Pulmonary effort is normal. No respiratory distress. Skin:     Findings: No rash (visible). Neurological:      Mental Status: She is alert and oriented to person, place, and time.    Psychiatric:         Mood and Affect: Mood normal.         Behavior: Behavior normal.

## 2023-07-24 NOTE — ASSESSMENT & PLAN NOTE
-Discussed options of HealthyCORE-Intensive Lifestyle Intervention Program, Very Low Calorie Diet-VLCD, Conservative Program, Chris-En-Y Gastric Bypass and Vertical Sleeve Gastrectomy and the role of weight loss medications. Explained the importance of making lifestyle changes first before starting anti-obesity medications.  -Initial weight loss goal of 5-10% weight loss for improved health  -NEEDS LABS-recheck b12, was low in 2021  -Labs reviewed from prior and all within acceptable limits  - STOP BANG-3/8    -Patient is interested in pursuing healthycore    Goals:  -discussed starting to balance calories throughout the day. Recommend having a light breakfast to start with a protein in it.   -discussed cutting back on alcohol intake  -recommend adding on exercise outside of work with 1.5 hour goal    Discussed medication options and she was interested in starting on wegovy. Discussed supply issue Patient denies personal and family history of MCT and MEN2 tumors. Patient denies personal history of pancreatitis. Side effects discussed but not limited to diarrhea, bloating, constipation, GI upset, heartburn, increased heart rate, headache, low blood sugar, fatigue and dizziness. Titration and medication administration discussed.

## 2023-07-24 NOTE — PROGRESS NOTES
Assessment/Plan:    No problem-specific Assessment & Plan notes found for this encounter. Follow up in approximately {FOLLOW UP:55509} with {WM Follow Up:74153}. Goals:  Food log (ie.) www.Motion[employer]pal.com,[employer]. [employer],L[employer] Angel[employer].[email or web address],OnTheList. com,etc. baritastic  No sugary beverages. At least 64oz of water daily. Increase physical activity by 10 minutes daily. Gradually increase physical activity to a goal of 5 days per week for 30 minutes of MODERATE intensity PLUS 2 days per week of FULL BODY resistance training      Diagnoses and all orders for this visit:    PCOS (polycystic ovarian syndrome)  -     Ambulatory Referral to Weight Management    Obesity, Class III, BMI 40-49.9 (morbid obesity) ([address])  -     Ambulatory Referral to Weight Management          Subjective:   Chief Complaint   Patient presents with   • Consult     MWM Consult; Gw-160lb; waist-40in ; Stop bang-2/8       Patient ID: Carlie Franco  is a 26 y.o. female with excess weight/obesity here to pursue weight management. Past Medical History:   Diagnosis Date   • Bipolar 1 disorder ([address])    • Chlamydia    • PCOS (polycystic ovarian syndrome) 2/9/2022   • Psychiatric disorder     bipolar    • Vertigo        HPI: Here for MWM consult    Obesity/Excess Weight:  Severity: class III  Onset:  ***    Modifiers: {MODIFIERS (Optional):73817}  Contributing factors: {CONTRIBUTING FACTORS (Optional):94266}  Associated symptoms: {ASSOCIATED SYMPTOMS (Optional):76117}      Goals:160lb  Hydration:  Alcohol:   Exercise:  Occupation:  Sleep:  Dining out/takeout:    Colonoscopy-{yes/no/not applicable:47082}      The following portions of the patient's history were reviewed and updated as appropriate:   She  has a past medical history of Bipolar 1 disorder ([address]), Chlamydia, PCOS (polycystic ovarian syndrome) (2/9/2022), Psychiatric disorder, and Vertigo.   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 Her family history includes Anxiety disorder in her family; Brain cancer in her maternal grandfather; Breast cancer in her family; Breast cancer (age of onset: 61) in her maternal grandmother and paternal grandmother; Diabetes in her family; Hypertension in her family; Lung cancer in her paternal grandfather; Other in her family. She  reports that she has never smoked. She has never used smokeless tobacco. She reports current alcohol use of about 5.0 standard drinks of alcohol per week. She reports current drug use. Drug: Marijuana. Current Outpatient Medications   Medication Sig Dispense Refill   • drospirenone-ethinyl estradiol (Vestura) 3-0.02 MG per tablet Take 1 tablet by mouth daily 84 tablet 3     Current Facility-Administered Medications   Medication Dose Route Frequency Provider Last Rate Last Admin   • cyanocobalamin injection 1,000 mcg  1,000 mcg Intramuscular Q30 Days KATINA Grande   1,000 mcg at 08/25/20 1517     Current Outpatient Medications on File Prior to Visit   Medication Sig   • drospirenone-ethinyl estradiol (Vestura) 3-0.02 MG per tablet Take 1 tablet by mouth daily     Current Facility-Administered Medications on File Prior to Visit   Medication   • cyanocobalamin injection 1,000 mcg     She is allergic to azithromycin. .    Review of Systems    Objective:    /80   Pulse 80   Resp 16   Ht 5' 6" (1.676 m)   Wt 117 kg (258 lb 9.6 oz)   BMI 41.74 kg/m²     Physical Exam

## 2023-07-31 ENCOUNTER — OFFICE VISIT (OUTPATIENT)
Dept: FAMILY MEDICINE CLINIC | Facility: CLINIC | Age: 23
End: 2023-07-31
Payer: COMMERCIAL

## 2023-07-31 VITALS
BODY MASS INDEX: 41.32 KG/M2 | OXYGEN SATURATION: 98 % | DIASTOLIC BLOOD PRESSURE: 80 MMHG | HEART RATE: 76 BPM | TEMPERATURE: 98.4 F | SYSTOLIC BLOOD PRESSURE: 115 MMHG | WEIGHT: 256 LBS

## 2023-07-31 DIAGNOSIS — B96.89 BV (BACTERIAL VAGINOSIS): Primary | ICD-10-CM

## 2023-07-31 DIAGNOSIS — N76.0 BV (BACTERIAL VAGINOSIS): Primary | ICD-10-CM

## 2023-07-31 PROCEDURE — 3725F SCREEN DEPRESSION PERFORMED: CPT | Performed by: NURSE PRACTITIONER

## 2023-07-31 PROCEDURE — 99213 OFFICE O/P EST LOW 20 MIN: CPT | Performed by: NURSE PRACTITIONER

## 2023-07-31 RX ORDER — METRONIDAZOLE 500 MG/1
500 TABLET ORAL EVERY 12 HOURS SCHEDULED
Qty: 14 TABLET | Refills: 0 | Status: SHIPPED | OUTPATIENT
Start: 2023-07-31 | End: 2023-08-07

## 2023-07-31 NOTE — PROGRESS NOTES
[address] [place] [address] Medical        NAME: Carlie Franco is a 26 y.o. female  : 2000    MRN: [id number]  DATE: 2023  TIME: 2:07 PM    Assessment and Plan   BV (bacterial vaginosis) [N76.0, B96.89]  1. BV (bacterial vaginosis)  metroNIDAZOLE (FLAGYL) 500 mg tablet            Patient Instructions     Patient Instructions     Flagyl as prescribed for BV  Call with problems/concerns      Bacterial Vaginosis   AMBULATORY CARE:   Bacterial vaginosis  is an infection in the vagina. It may cause vaginitis (irritation and inflammation of the vagina). The cause is not known. Bacteria normally found in the vagina are imbalanced. Your risk increases if you are sexually active, you use a douche, or you have an intrauterine device (IUD). Common signs and symptoms of bacterial vaginosis:   • White, gray, or yellow vaginal discharge    • Vaginal discharge that smells like fish    • Itching or burning around the outside of your vagina    Call your doctor or gynecologist if:   • Your symptoms come back or do not improve with treatment. • You have vaginal bleeding that is not your monthly period. • You have questions or concerns about your condition or care. Antibiotics  are given to kill the bacteria. They may be given as a pill or as a cream to put in your vagina. Bacterial vaginosis and pregnancy:  If you have bacterial vaginosis during pregnancy, your baby may be born early or have a low birth weight. Your healthcare provider may recommend testing for bacterial vaginosis before or during your pregnancy. He or she will talk to you about your risk for premature delivery, and make sure you know the benefits and risks of testing. Prevent bacterial vaginosis:   • Keep your vaginal area clean and dry. Wear underwear and pantyhose with a cotton crotch. Wipe from front to back after you urinate or have a bowel movement.  After you bathe, rinse soap from your vaginal area to decrease your risk for irritation. • Do not use products that cause irritation. Always use unscented tampons or sanitary pads. Do not use feminine sprays, powders, or scented tampons. They may cause irritation and increase your risk for vaginosis. Detergents and fabric softeners may also cause irritation. • Do not use a douche. This can cause an imbalance in healthy vaginal bacteria. • Use latex condoms during sex. This helps prevent another infection and keeps your partner from getting the infection. Follow up with your doctor or gynecologist as directed: Bacterial vaginosis increases the risk for several health problems, such as pelvic inflammatory disease (PID) or sexually transmitted infections. Work with your healthcare providers to schedule regular appointments to check for health problems. Write down your questions so you remember to ask them during your visits. © Copyright [employer] 2022 Information is for End User's use only and may not be sold, redistributed or otherwise used for commercial purposes. The above information is an  only. It is not intended as medical advice for individual conditions or treatments. Talk to your doctor, nurse or pharmacist before following any medical regimen to see if it is safe and effective for you. Chief Complaint     Chief Complaint   Patient presents with   • Urinary Tract Infection     Uncomfortable feeling when urinating. Slight vaginal smell. History of Present Illness       C/o vaginal burning, discharge and "fishy odor". UA shows trace leukocytes. Review of Systems   Review of Systems   Constitutional: Negative for activity change and fever. Respiratory: Negative for chest tightness and shortness of breath. Cardiovascular: Negative for chest pain. Gastrointestinal: Negative for abdominal pain. Genitourinary: Positive for frequency and vaginal discharge.  Negative for decreased urine volume, dysuria, hematuria, pelvic pain, urgency and vaginal bleeding. Skin: Negative for color change and rash. Current Medications       Current Outpatient Medications:   •  metroNIDAZOLE (FLAGYL) 500 mg tablet, Take 1 tablet (500 mg total) by mouth every 12 (twelve) hours for 7 days, Disp: 14 tablet, Rfl: 0    Current Facility-Administered Medications:   •  cyanocobalamin injection 1,000 mcg, 1,000 mcg, Intramuscular, Q30 Days, KATINA Cho, 1,000 mcg at 08/25/20 1517    Current Allergies     Allergies as of 07/31/2023 - Reviewed 07/31/2023   Allergen Reaction Noted   • Azithromycin Abdominal Pain 09/21/2017            The following portions of the patient's history were reviewed and updated as appropriate: allergies, current medications, past family history, past medical history, past social history, past surgical history and problem list.     Past Medical History:   Diagnosis Date   • Bipolar 1 disorder ([address])    • Chlamydia    • PCOS (polycystic ovarian syndrome) 2/9/2022   • Psychiatric disorder     bipolar    • Vertigo        History reviewed. No pertinent surgical history. Family History   Problem Relation Age of Onset   • Breast cancer Maternal Grandmother 61   • Brain cancer Maternal Grandfather    • Breast cancer Paternal Grandmother 61   • Lung cancer Paternal Grandfather    • Anxiety disorder Family    • Other Family         cardiac disorder   • Diabetes Family    • Hypertension Family    • Breast cancer Family    • Colon cancer Neg Hx    • Colon polyps Neg Hx    • Ovarian cancer Neg Hx          Medications have been verified. Objective   /80 (BP Location: Left arm, Patient Position: Sitting, Cuff Size: Standard)   Pulse 76   Temp 98.4 °F (36.9 °C) (Tympanic)   Wt 116 kg (256 lb)   SpO2 98%   BMI 41.32 kg/m²        Physical Exam     Physical Exam  Vitals and nursing note reviewed. Constitutional:       General: She is not in acute distress. Appearance: Normal appearance. She is not ill-appearing. HENT:      Head: Normocephalic. Eyes:      Extraocular Movements: Extraocular movements intact. Cardiovascular:      Rate and Rhythm: Normal rate and regular rhythm. Heart sounds: Normal heart sounds. Pulmonary:      Effort: Pulmonary effort is normal.      Breath sounds: Normal breath sounds. Abdominal:      Palpations: Abdomen is soft. Tenderness: There is no abdominal tenderness. Genitourinary:     Vagina: Vaginal discharge present. Musculoskeletal:         General: Normal range of motion. Skin:     General: Skin is warm and dry. Neurological:      Mental Status: She is alert and oriented to person, place, and time.    Psychiatric:         Mood and Affect: Mood normal.         Behavior: Behavior normal.

## 2023-07-31 NOTE — PATIENT INSTRUCTIONS
Flagyl as prescribed for BV  Call with problems/concerns      Bacterial Vaginosis   AMBULATORY CARE:   Bacterial vaginosis  is an infection in the vagina. It may cause vaginitis (irritation and inflammation of the vagina). The cause is not known. Bacteria normally found in the vagina are imbalanced. Your risk increases if you are sexually active, you use a douche, or you have an intrauterine device (IUD). Common signs and symptoms of bacterial vaginosis:   White, gray, or yellow vaginal discharge    Vaginal discharge that smells like fish    Itching or burning around the outside of your vagina    Call your doctor or gynecologist if:   Your symptoms come back or do not improve with treatment. You have vaginal bleeding that is not your monthly period. You have questions or concerns about your condition or care. Antibiotics  are given to kill the bacteria. They may be given as a pill or as a cream to put in your vagina. Bacterial vaginosis and pregnancy:  If you have bacterial vaginosis during pregnancy, your baby may be born early or have a low birth weight. Your healthcare provider may recommend testing for bacterial vaginosis before or during your pregnancy. He or she will talk to you about your risk for premature delivery, and make sure you know the benefits and risks of testing. Prevent bacterial vaginosis:   Keep your vaginal area clean and dry. Wear underwear and pantyhose with a cotton crotch. Wipe from front to back after you urinate or have a bowel movement. After you bathe, rinse soap from your vaginal area to decrease your risk for irritation. Do not use products that cause irritation. Always use unscented tampons or sanitary pads. Do not use feminine sprays, powders, or scented tampons. They may cause irritation and increase your risk for vaginosis. Detergents and fabric softeners may also cause irritation. Do not use a douche.   This can cause an imbalance in healthy vaginal bacteria. Use latex condoms during sex. This helps prevent another infection and keeps your partner from getting the infection. Follow up with your doctor or gynecologist as directed: Bacterial vaginosis increases the risk for several health problems, such as pelvic inflammatory disease (PID) or sexually transmitted infections. Work with your healthcare providers to schedule regular appointments to check for health problems. Write down your questions so you remember to ask them during your visits. © Copyright [employer] 2022 Information is for End User's use only and may not be sold, redistributed or otherwise used for commercial purposes. The above information is an  only. It is not intended as medical advice for individual conditions or treatments. Talk to your doctor, nurse or pharmacist before following any medical regimen to see if it is safe and effective for you.

## 2023-10-02 ENCOUNTER — TELEPHONE (OUTPATIENT)
Dept: OBGYN CLINIC | Facility: CLINIC | Age: 23
End: 2023-10-02

## 2023-10-05 ENCOUNTER — OFFICE VISIT (OUTPATIENT)
Dept: OBGYN CLINIC | Facility: CLINIC | Age: 23
End: 2023-10-05

## 2023-10-05 VITALS
DIASTOLIC BLOOD PRESSURE: 74 MMHG | HEIGHT: 66 IN | HEART RATE: 71 BPM | BODY MASS INDEX: 40.92 KG/M2 | WEIGHT: 254.6 LBS | SYSTOLIC BLOOD PRESSURE: 110 MMHG

## 2023-10-05 DIAGNOSIS — N76.0 BV (BACTERIAL VAGINOSIS): ICD-10-CM

## 2023-10-05 DIAGNOSIS — N89.8 VAGINAL DISCHARGE: Primary | ICD-10-CM

## 2023-10-05 DIAGNOSIS — B37.31 VULVOVAGINAL CANDIDIASIS: ICD-10-CM

## 2023-10-05 DIAGNOSIS — B96.89 BV (BACTERIAL VAGINOSIS): ICD-10-CM

## 2023-10-05 LAB
BV WHIFF TEST VAG QL: POSITIVE
CLUE CELLS SPEC QL WET PREP: POSITIVE
PH SMN: 5.5 [PH]
SL AMB POCT WET MOUNT: ABNORMAL
T VAGINALIS VAG QL WET PREP: NEGATIVE
YEAST VAG QL WET PREP: POSITIVE

## 2023-10-05 PROCEDURE — 99213 OFFICE O/P EST LOW 20 MIN: CPT | Performed by: NURSE PRACTITIONER

## 2023-10-05 PROCEDURE — 87210 SMEAR WET MOUNT SALINE/INK: CPT | Performed by: NURSE PRACTITIONER

## 2023-10-05 RX ORDER — METRONIDAZOLE 7.5 MG/G
1 GEL VAGINAL
Qty: 5 G | Refills: 0 | Status: SHIPPED | OUTPATIENT
Start: 2023-10-05 | End: 2023-10-10

## 2023-10-05 RX ORDER — FLUCONAZOLE 150 MG/1
150 TABLET ORAL
Qty: 2 TABLET | Refills: 0 | Status: SHIPPED | OUTPATIENT
Start: 2023-10-05 | End: 2023-10-11

## 2023-10-05 NOTE — PROGRESS NOTES
PROBLEM GYNECOLOGICAL VISIT    Trina Mckeon is a 26 y.o. female who presents today with complaint of vaginitis. Her general medical history has been reviewed and she reports it as follows:    Past Medical History:   Diagnosis Date   • Bipolar 1 disorder ([address])    • Chlamydia    • PCOS (polycystic ovarian syndrome) 2022   • Psychiatric disorder     bipolar    • Vertigo      History reviewed. No pertinent surgical history. OB History        0    Para   0    Term   0       0    AB   0    Living   0       SAB   0    IAB   0    Ectopic   0    Multiple   0    Live Births   0               Social History     Tobacco Use   • Smoking status: Never   • Smokeless tobacco: Never   Vaping Use   • Vaping Use: Former   • Quit date: 8/10/2022   Substance Use Topics   • Alcohol use: Yes     Alcohol/week: 5.0 standard drinks of alcohol     Types: 5 Standard drinks or equivalent per week     Comment: 1 - 2 drinks monthly   • Drug use: Yes     Types: Marijuana     Comment: Medical MJ     Social History     Substance and Sexual Activity   Sexual Activity Yes   • Partners: Female, Male   • Birth control/protection: None       Current Outpatient Medications   Medication Instructions   • fluconazole (DIFLUCAN) 150 mg, Oral, Every 5 Days   • metroNIDAZOLE (METROGEL) 0.75 % vaginal gel 1 Application, Vaginal, Daily at bedtime       History of Present Illness:   Jasbir Bonner presents today reporting suspected vaginitis. She was treated for BV in August and feels the symptoms initially improved, but have since returned. She has had a very strong, unpleasant odor as well as a grey vaginal discharge. She denies any itching or irritation. She denies any new hygiene products. No new sexual partners. Review of Systems:  Review of Systems   Constitutional: Negative. Gastrointestinal: Negative. Genitourinary: Positive for vaginal discharge.        Physical Exam:  /74 (BP Location: Right arm)   Pulse 71   Ht 5' 6" (1.676 m)   Wt 115 kg (254 lb 9.6 oz)   LMP  (LMP Unknown)   BMI 41.09 kg/m²   Physical Exam  Constitutional:       General: She is not in acute distress. Appearance: Normal appearance. Genitourinary:      Vulva normal.      Vaginal discharge and bleeding present. No cervical motion tenderness. Neurological:      Mental Status: She is alert. Skin:     General: Skin is warm and dry. Psychiatric:         Mood and Affect: Mood normal.         Behavior: Behavior normal.   Vitals reviewed. Point of Care Testing:   -Wet mount: +yeast, +clue cells    -KOH mount: +yeast    -Whiff: positive    -pH 5.5     Assessment:   1. Bacterial vaginosis    2. Vulvovaginal candidiasis     Plan:   1. Rx for Metrogel and Diflucan    2. Reviewed vaginitis prevention measures. 3. Declines STI screening    4. Return for annual exam or sooner if needed     Reviewed with patient that test results are available in MyChart immediately, but that they will not necessarily be reviewed by me immediately. Explained that I will review results at my earliest opportunity and contact patient appropriately.

## 2023-11-27 ENCOUNTER — OFFICE VISIT (OUTPATIENT)
Dept: FAMILY MEDICINE CLINIC | Facility: CLINIC | Age: 23
End: 2023-11-27
Payer: COMMERCIAL

## 2023-11-27 VITALS
HEIGHT: 66 IN | HEART RATE: 72 BPM | BODY MASS INDEX: 40.82 KG/M2 | WEIGHT: 254 LBS | DIASTOLIC BLOOD PRESSURE: 74 MMHG | TEMPERATURE: 97.2 F | SYSTOLIC BLOOD PRESSURE: 120 MMHG

## 2023-11-27 DIAGNOSIS — N39.43 POST-VOID DRIBBLING: Primary | ICD-10-CM

## 2023-11-27 DIAGNOSIS — R29.898 WEAKNESS OF BOTH LOWER EXTREMITIES: ICD-10-CM

## 2023-11-27 DIAGNOSIS — R20.0 BILATERAL LEG NUMBNESS: ICD-10-CM

## 2023-11-27 PROCEDURE — 99214 OFFICE O/P EST MOD 30 MIN: CPT | Performed by: FAMILY MEDICINE

## 2023-11-27 NOTE — PROGRESS NOTES
Assessment/Plan:    No problem-specific Assessment & Plan notes found for this encounter. Diagnoses and all orders for this visit:    Post-void dribbling  -     Ambulatory Referral to Urogynecology; Future    Weakness of both lower extremities  -     MRI lumbar spine wo contrast; Future    Bilateral leg numbness  -     MRI lumbar spine wo contrast; Future          Subjective:   Chief Complaint   Patient presents with    bladder issue        Patient ID: Joseph Mckeon is a 26 y.o. female. Urinary incontinence-- requiring use of pads. The following portions of the patient's history were reviewed and updated as appropriate: allergies, current medications, past family history, past medical history, past social history, past surgical history and problem list.    Review of Systems   Constitutional:  Negative for fatigue, fever and unexpected weight change. HENT:  Negative for congestion, sinus pain and sore throat. Eyes:  Negative for visual disturbance. Respiratory:  Negative for shortness of breath and wheezing. Cardiovascular:  Negative for chest pain and palpitations. Gastrointestinal:  Negative for abdominal pain, nausea and vomiting. Genitourinary:         Incontinence    Musculoskeletal: Negative. Negative for arthralgias and myalgias. Neurological:  Positive for weakness and numbness. Negative for syncope. BLLE numbness/weakness   Psychiatric/Behavioral: Negative. Negative for confusion, dysphoric mood and suicidal ideas. Objective:  Vitals:    11/27/23 1512   BP: 120/74   BP Location: Left arm   Patient Position: Sitting   Cuff Size: Standard   Pulse: 72   Temp: (!) 97.2 °F (36.2 °C)   TempSrc: Tympanic   Weight: 115 kg (254 lb)   Height: 5' 6" (1.676 m)      Physical Exam  Vitals and nursing note reviewed. Constitutional:       Appearance: She is well-developed. HENT:      Right Ear: Ear canal normal. Tympanic membrane is not injected.       Left Ear: Ear canal normal. Tympanic membrane is not injected. Nose: Nose normal.   Eyes:      General:         Right eye: No discharge. Left eye: No discharge. Conjunctiva/sclera: Conjunctivae normal.      Pupils: Pupils are equal, round, and reactive to light. Neck:      Thyroid: No thyromegaly. Cardiovascular:      Rate and Rhythm: Normal rate and regular rhythm. Heart sounds: Normal heart sounds. No murmur heard. Pulmonary:      Effort: Pulmonary effort is normal. No respiratory distress. Breath sounds: Normal breath sounds. No wheezing. Abdominal:      General: Bowel sounds are normal. There is no distension. Palpations: Abdomen is soft. Tenderness: There is no abdominal tenderness. Musculoskeletal:         General: Normal range of motion. Cervical back: Normal range of motion and neck supple. Lymphadenopathy:      Cervical: No cervical adenopathy. Skin:     General: Skin is warm and dry. Neurological:      Mental Status: She is alert and oriented to person, place, and time. She is not disoriented. Sensory: No sensory deficit. Motor: No weakness. Coordination: Coordination normal.      Gait: Gait normal.      Deep Tendon Reflexes: Reflexes are normal and symmetric. Psychiatric:         Speech: Speech normal.         Behavior: Behavior normal.         Thought Content:  Thought content normal.         Judgment: Judgment normal.

## 2023-12-11 ENCOUNTER — HOSPITAL ENCOUNTER (OUTPATIENT)
Dept: MRI IMAGING | Facility: HOSPITAL | Age: 23
Discharge: HOME | End: 2023-12-11
Payer: COMMERCIAL

## 2023-12-11 DIAGNOSIS — R20.0 BILATERAL LEG NUMBNESS: ICD-10-CM

## 2023-12-11 DIAGNOSIS — R29.898 WEAKNESS OF BOTH LOWER EXTREMITIES: ICD-10-CM

## 2023-12-11 PROCEDURE — 72148 MRI LUMBAR SPINE W/O DYE: CPT

## 2023-12-11 PROCEDURE — G1004 CDSM NDSC: HCPCS

## 2023-12-19 ENCOUNTER — TELEPHONE (OUTPATIENT)
Dept: FAMILY MEDICINE CLINIC | Facility: CLINIC | Age: 23
End: 2023-12-19

## 2023-12-19 NOTE — TELEPHONE ENCOUNTER
Pt aware.      ----- Message from Kp Yadav MD sent at 12/18/2023  9:46 AM EST -----  No herniated disc etc----ref compr spine or pain MD  ----- Message -----  From: Interface, Radiology Results In  Sent: 12/18/2023   8:01 AM EST  To: Kp Yadav MD

## 2024-06-04 ENCOUNTER — OFFICE VISIT (OUTPATIENT)
Dept: FAMILY MEDICINE CLINIC | Facility: CLINIC | Age: 24
End: 2024-06-04
Payer: COMMERCIAL

## 2024-06-04 VITALS
BODY MASS INDEX: 34.55 KG/M2 | WEIGHT: 215 LBS | SYSTOLIC BLOOD PRESSURE: 124 MMHG | HEART RATE: 105 BPM | TEMPERATURE: 99.8 F | DIASTOLIC BLOOD PRESSURE: 76 MMHG | HEIGHT: 66 IN | OXYGEN SATURATION: 99 % | RESPIRATION RATE: 18 BRPM

## 2024-06-04 DIAGNOSIS — F31.9 BIPOLAR 1 DISORDER (HCC): ICD-10-CM

## 2024-06-04 DIAGNOSIS — E78.2 MIXED HYPERLIPIDEMIA: ICD-10-CM

## 2024-06-04 DIAGNOSIS — R53.83 OTHER FATIGUE: ICD-10-CM

## 2024-06-04 DIAGNOSIS — Z00.00 WELLNESS EXAMINATION: ICD-10-CM

## 2024-06-04 DIAGNOSIS — Z77.21 HISTORY OF EXPOSURE TO HAZARDOUS BODILY FLUIDS: ICD-10-CM

## 2024-06-04 DIAGNOSIS — F43.10 PTSD (POST-TRAUMATIC STRESS DISORDER): Primary | ICD-10-CM

## 2024-06-04 PROCEDURE — 99214 OFFICE O/P EST MOD 30 MIN: CPT | Performed by: FAMILY MEDICINE

## 2024-06-04 PROCEDURE — 99395 PREV VISIT EST AGE 18-39: CPT | Performed by: FAMILY MEDICINE

## 2024-06-04 NOTE — PROGRESS NOTES
Assessment/Plan:    No problem-specific Assessment & Plan notes found for this encounter.       Diagnoses and all orders for this visit:    PTSD (post-traumatic stress disorder)    Bipolar 1 disorder (HCC)    Mixed hyperlipidemia  -     CBC and differential; Future  -     Comprehensive metabolic panel; Future  -     Lipid Panel with Direct LDL reflex; Future  -     T4, free; Future  -     TSH, 3rd generation; Future  -     HIV 1/2 AG/AB W REFLEX [facility] only; Future  -     CBC and differential  -     Comprehensive metabolic panel  -     Lipid Panel with Direct LDL reflex  -     T4, free  -     TSH, 3rd generation  -     HIV 1/2 AG/AB W RE[facility]LEX [facility] only    History of exposure to hazardous bodily fluids  -     CBC and differential; Future  -     Comprehensive metabolic panel; Future  -     Lipid Panel with Direct LDL reflex; Future  -     T4, free; Future  -     TSH, 3rd generation; Future  -     HIV 1/2 AG/AB W RE[facility]LEX [facility] only; Future  -     CBC and differential  -     Comprehensive metabolic panel  -     Lipid Panel with Direct LDL reflex  -     T4, free  -     TSH, 3rd generation  -     HIV 1/2 AG/AB W RE[facility]LEX [facility] only    Wellness examination    Other fatigue  -     CBC and differential; Future  -     Comprehensive metabolic panel; Future  -     Lipid Panel with Direct LDL reflex; Future  -     T4, free; Future  -     TSH, 3rd generation; Future  -     HIV 1/2 AG/AB W RE[facility]LEX [facility] only; Future  -     CBC and differential  -     Comprehensive metabolic panel  -     Lipid Panel with Direct LDL reflex  -     T4, free  -     TSH, 3rd generation  -     HIV 1/2 AG/AB W REFLEX [facility] only          Subjective:   Chief Complaint   Patient presents with    Physical Exam        Patient ID: Teresa Peña is a 24 y.o. female.    HPI    The following portions of the patient's history were reviewed and updated as appropriate: allergies, current medications,  "past family history, past medical history, past social history, past surgical history and problem list.    Review of Systems   Constitutional:  Negative for fatigue, fever and unexpected weight change.   HENT:  Negative for congestion, sinus pain and sore throat.    Eyes:  Negative for visual disturbance.   Respiratory:  Negative for shortness of breath and wheezing.    Cardiovascular:  Negative for chest pain and palpitations.   Gastrointestinal:  Negative for abdominal pain, nausea and vomiting.   Musculoskeletal: Negative.  Negative for arthralgias and myalgias.   Neurological:  Negative for syncope, weakness and numbness.   Psychiatric/Behavioral: Negative.  Negative for confusion, dysphoric mood and suicidal ideas.          Objective:  Vitals:    06/04/24 0847   BP: 124/76   BP Location: Left arm   Patient Position: Sitting   Cuff Size: Adult   Pulse: 105   Resp: 18   Temp: 99.8 °F (37.7 °C)   TempSrc: Tympanic   SpO2: 99%   Weight: 97.5 kg (215 lb)   Height: 5' 6\" (1.676 m)      Physical Exam  Vitals and nursing note reviewed.   Constitutional:       General: She is not in acute distress.     Appearance: She is well-developed. She is not diaphoretic.   HENT:      Head: Normocephalic and atraumatic.      Right Ear: Tympanic membrane, ear canal and external ear normal.      Left Ear: Tympanic membrane, ear canal and external ear normal.      Nose: Rhinorrhea present. No epistaxis.      Right Sinus: Maxillary sinus tenderness present.      Left Sinus: Maxillary sinus tenderness present.      Mouth/Throat:      Mouth: Oropharynx is clear and moist and mucous membranes are normal.      Pharynx: Uvula midline.   Cardiovascular:      Rate and Rhythm: Normal rate and regular rhythm.      Heart sounds: Normal heart sounds. No murmur heard.     No friction rub. No gallop.   Pulmonary:      Effort: Pulmonary effort is normal. No respiratory distress.      Breath sounds: Normal breath sounds. No wheezing or rales. "   Abdominal:      General: There is no distension.   Musculoskeletal:         General: Normal range of motion.   Neurological:      Mental Status: She is alert and oriented to person, place, and time.   Psychiatric:         Mood and Affect: Mood and affect normal.         Behavior: Behavior normal.         Thought Content: Thought content normal.         Judgment: Judgment normal.

## 2024-06-05 LAB
ALBUMIN SERPL-MCNC: 4.5 G/DL (ref 3.6–5.1)
ALBUMIN/GLOB SERPL: 1.8 (CALC) (ref 1–2.5)
ALP SERPL-CCNC: 59 U/L (ref 31–125)
ALT SERPL-CCNC: 13 U/L (ref 6–29)
AST SERPL-CCNC: 14 U/L (ref 10–30)
BASOPHILS # BLD AUTO: 49 CELLS/UL (ref 0–200)
BASOPHILS NFR BLD AUTO: 0.9 %
BILIRUB SERPL-MCNC: 0.5 MG/DL (ref 0.2–1.2)
BUN SERPL-MCNC: 9 MG/DL (ref 7–25)
BUN/CREAT SERPL: NORMAL (CALC) (ref 6–22)
C TRACH RRNA SPEC QL NAA+PROBE: NOT DETECTED
CALCIUM SERPL-MCNC: 9.6 MG/DL (ref 8.6–10.2)
CHLORIDE SERPL-SCNC: 106 MMOL/L (ref 98–110)
CHOLEST SERPL-MCNC: 126 MG/DL
CHOLEST/HDLC SERPL: 3.1 (CALC)
CO2 SERPL-SCNC: 24 MMOL/L (ref 20–32)
CREAT SERPL-MCNC: 0.82 MG/DL (ref 0.5–0.96)
EOSINOPHIL # BLD AUTO: 70 CELLS/UL (ref 15–500)
EOSINOPHIL NFR BLD AUTO: 1.3 %
ERYTHROCYTE [DISTWIDTH] IN BLOOD BY AUTOMATED COUNT: 13.3 % (ref 11–15)
GFR/BSA.PRED SERPLBLD CYS-BASED-ARV: 103 ML/MIN/1.73M2
GLOBULIN SER CALC-MCNC: 2.5 G/DL (CALC) (ref 1.9–3.7)
GLUCOSE SERPL-MCNC: 84 MG/DL (ref 65–99)
HCT VFR BLD AUTO: 40.6 % (ref 35–45)
HDLC SERPL-MCNC: 41 MG/DL
HGB BLD-MCNC: 13.5 G/DL (ref 11.7–15.5)
HIV 1+2 AB+HIV1 P24 AG SERPL QL IA: NORMAL
LDLC SERPL CALC-MCNC: 71 MG/DL (CALC)
LYMPHOCYTES # BLD AUTO: 1534 CELLS/UL (ref 850–3900)
LYMPHOCYTES NFR BLD AUTO: 28.4 %
MCH RBC QN AUTO: 31.1 PG (ref 27–33)
MCHC RBC AUTO-ENTMCNC: 33.3 G/DL (ref 32–36)
MCV RBC AUTO: 93.5 FL (ref 80–100)
MONOCYTES # BLD AUTO: 556 CELLS/UL (ref 200–950)
MONOCYTES NFR BLD AUTO: 10.3 %
N GONORRHOEA RRNA SPEC QL NAA+PROBE: NOT DETECTED
NEUTROPHILS # BLD AUTO: 3191 CELLS/UL (ref 1500–7800)
NEUTROPHILS NFR BLD AUTO: 59.1 %
NONHDLC SERPL-MCNC: 85 MG/DL (CALC)
PLATELET # BLD AUTO: 346 THOUSAND/UL (ref 140–400)
PMV BLD REES-ECKER: 10.7 FL (ref 7.5–12.5)
POTASSIUM SERPL-SCNC: 4 MMOL/L (ref 3.5–5.3)
PROT SERPL-MCNC: 7 G/DL (ref 6.1–8.1)
RBC # BLD AUTO: 4.34 MILLION/UL (ref 3.8–5.1)
SODIUM SERPL-SCNC: 140 MMOL/L (ref 135–146)
T4 FREE SERPL-MCNC: 1.1 NG/DL (ref 0.8–1.8)
TRIGL SERPL-MCNC: 65 MG/DL
TSH SERPL-ACNC: 2.19 MIU/L
WBC # BLD AUTO: 5.4 THOUSAND/UL (ref 3.8–10.8)

## 2024-07-23 ENCOUNTER — OFFICE VISIT (OUTPATIENT)
Dept: FAMILY MEDICINE CLINIC | Facility: CLINIC | Age: 24
End: 2024-07-23
Payer: COMMERCIAL

## 2024-07-23 VITALS
DIASTOLIC BLOOD PRESSURE: 78 MMHG | OXYGEN SATURATION: 99 % | WEIGHT: 190 LBS | BODY MASS INDEX: 30.67 KG/M2 | SYSTOLIC BLOOD PRESSURE: 118 MMHG | TEMPERATURE: 96.5 F | HEART RATE: 82 BPM

## 2024-07-23 DIAGNOSIS — R10.10 PAIN OF UPPER ABDOMEN: Primary | ICD-10-CM

## 2024-07-23 DIAGNOSIS — K59.00 CONSTIPATION, UNSPECIFIED CONSTIPATION TYPE: ICD-10-CM

## 2024-07-23 LAB
SL AMB  POCT GLUCOSE, UA: NORMAL
SL AMB LEUKOCYTE ESTERASE,UA: NORMAL
SL AMB POCT BILIRUBIN,UA: NORMAL
SL AMB POCT BLOOD,UA: NORMAL
SL AMB POCT CLARITY,UA: CLEAR
SL AMB POCT COLOR,UA: NORMAL
SL AMB POCT KETONES,UA: NORMAL
SL AMB POCT NITRITE,UA: NORMAL
SL AMB POCT PH,UA: 6.5
SL AMB POCT SPECIFIC GRAVITY,UA: 0.2
SL AMB POCT URINE HCG: NEGATIVE
SL AMB POCT URINE PROTEIN: NORMAL
SL AMB POCT UROBILINOGEN: 0.2

## 2024-07-23 PROCEDURE — 99214 OFFICE O/P EST MOD 30 MIN: CPT | Performed by: NURSE PRACTITIONER

## 2024-07-23 PROCEDURE — 81025 URINE PREGNANCY TEST: CPT | Performed by: NURSE PRACTITIONER

## 2024-07-23 PROCEDURE — 81002 URINALYSIS NONAUTO W/O SCOPE: CPT | Performed by: NURSE PRACTITIONER

## 2024-07-23 NOTE — ASSESSMENT & PLAN NOTE
Abdomen soft, no significant abdominal pain at present. Increase fluids, keep hydrated. rial OTC Miralax daily and Docusate Sodium (Colace 100 mg) twice a day. If no results can try OTC Fleets enema. GO to ED if pain becomes severe.

## 2024-07-23 NOTE — PROGRESS NOTES
Ambulatory Visit  Name: Teresa Peña      : 2000      MRN: [id number]  Encounter Provider: KATINA Cho  Encounter Date: 2024   Encounter department: [facility]    Assessment & Plan   1. Pain of upper abdomen  Assessment & Plan:  Recent labs normal. Schedule CT scan. If pain worsens, becomes severe go to ED for evaluation.  Orders:  -     CT abdomen w contrast; Future; Expected date: 2024  -     POCT urine dip  -     POCT urine HCG  2. Constipation, unspecified constipation type  Assessment & Plan:  Abdomen soft, no significant abdominal pain at present. Increase fluids, keep hydrated. rial OTC Miralax daily and Docusate Sodium (Colace 100 mg) twice a day. If no results can try OTC Fleets enema. GO to ED if pain becomes severe.       History of Present Illness     C/o abdominal pain x 2-3 weeks. Associated symptoms include constipation and nausea. Moving bowels every other day-small and hard. Over the last few months she changed her diet eating mostly lean proteins, very little carbs, only eating about 800 calories/day. she has lost 25 pounds since 24. LMP 24. Recent labs normal.    Constipation  This is a new problem. The current episode started 1 to 4 weeks ago. The problem is unchanged. Her stool frequency is 2 to 3 times per week. The stool is described as blood-tinged and pellet-like. She does not have bladder incontinence. She has not had a urinary tract infection. Associated symptoms include abdominal pain, nausea, rectal pain and weight loss. Pertinent negatives include no back pain, difficulty urinating or fever. The pain is located in the epigastric region, LUQ and RUQ. Abdominal pain severity is 5/10. Pain is described as sharp. Past treatments include nothing. There is no history of abdominal surgery, endocrine disease or metabolic disease. (PMH: PCOS). She has been eating less than usual. Urine output has been normal.   Abdominal  Pain  This is a new problem. The current episode started 1 to 4 weeks ago. The problem occurs constantly. The problem has been unchanged. The pain is located in the epigastric region, LUQ and RUQ. The pain is at a severity of 5/10. The quality of the pain is sharp and aching. Associated symptoms include constipation, nausea and weight loss. Pertinent negatives include no fever or headaches. The pain is relieved by Nothing. She has tried nothing for the symptoms. There is no history of abdominal surgery, colon cancer, Crohn's disease, gallstones, GERD, irritable bowel syndrome, pancreatitis, PUD or ulcerative colitis.       Review of Systems   Constitutional:  Positive for weight loss. Negative for activity change, diaphoresis and fever.   Respiratory:  Negative for chest tightness and shortness of breath.    Cardiovascular:  Negative for chest pain.   Gastrointestinal:  Positive for abdominal pain, constipation, nausea and rectal pain.   Genitourinary:  Negative for difficulty urinating.   Musculoskeletal:  Negative for back pain.   Neurological:  Negative for dizziness, weakness and headaches.       Objective   /78 (BP Location: Left arm, Patient Position: Sitting, Cuff Size: Standard)   Pulse 82   Temp (!) 96.5 °F (35.8 °C) (Tympanic)   Wt 86.2 kg (190 lb)   SpO2 99%   BMI 30.67 kg/m²     Physical Exam  Administrative Statements

## 2024-07-23 NOTE — PATIENT INSTRUCTIONS
Increase fiber in your diet, drink plenty of fluids, keep hydrated.  Trial OTC Miralax daily and Docusate Sodium (Colace 100 mg) twice a day.  If no results can try OTC Fleets Enema.  Schedule Ct scan of abdomen.  Go to ED if pain worsens, becomes severe.

## 2024-08-02 ENCOUNTER — HOSPITAL ENCOUNTER (OUTPATIENT)
Dept: RADIOLOGY | Age: 24
Discharge: HOME | End: 2024-08-02
Payer: COMMERCIAL

## 2024-08-02 DIAGNOSIS — R10.10 PAIN OF UPPER ABDOMEN: ICD-10-CM

## 2024-08-02 PROCEDURE — 74160 CT ABDOMEN W/CONTRAST: CPT

## 2024-08-02 RX ADMIN — IOHEXOL 100 ML: 350 INJECTION, SOLUTION INTRAVENOUS at 08:31

## 2024-08-08 ENCOUNTER — TELEPHONE (OUTPATIENT)
Dept: FAMILY MEDICINE CLINIC | Facility: CLINIC | Age: 24
End: 2024-08-08

## 2024-08-08 ENCOUNTER — TELEPHONE (OUTPATIENT)
Age: 24
End: 2024-08-08

## 2024-08-08 NOTE — TELEPHONE ENCOUNTER
Patient needs a TB test, can the provider please place the order for this. Her appt for is Friday 8/9/24 ay 9:30 Am      Thank you

## 2024-08-09 ENCOUNTER — CLINICAL SUPPORT (OUTPATIENT)
Dept: FAMILY MEDICINE CLINIC | Facility: CLINIC | Age: 24
End: 2024-08-09
Payer: COMMERCIAL

## 2024-08-09 DIAGNOSIS — Z11.1 SCREENING FOR TUBERCULOSIS: Primary | ICD-10-CM

## 2024-08-09 PROCEDURE — 86580 TB INTRADERMAL TEST: CPT

## 2024-08-12 ENCOUNTER — CLINICAL SUPPORT (OUTPATIENT)
Dept: FAMILY MEDICINE CLINIC | Facility: CLINIC | Age: 24
End: 2024-08-12

## 2024-08-12 DIAGNOSIS — Z11.1 ENCOUNTER FOR PPD SKIN TEST READING: Primary | ICD-10-CM

## 2024-08-12 LAB
INDURATION: 0 MM
TB SKIN TEST: NEGATIVE